# Patient Record
Sex: FEMALE | Race: WHITE | NOT HISPANIC OR LATINO | Employment: OTHER | URBAN - METROPOLITAN AREA
[De-identification: names, ages, dates, MRNs, and addresses within clinical notes are randomized per-mention and may not be internally consistent; named-entity substitution may affect disease eponyms.]

---

## 2020-11-07 ENCOUNTER — HOSPITAL ENCOUNTER (OUTPATIENT)
Dept: URGENT CARE | Facility: CLINIC | Age: 60
Discharge: HOME | End: 2020-11-07

## 2020-12-08 ENCOUNTER — CONVERSION ENCOUNTER (OUTPATIENT)
Dept: ORTHOPEDIC SURGERY | Facility: CLINIC | Age: 60
End: 2020-12-08

## 2020-12-08 ENCOUNTER — OFFICE VISIT CONVERTED (OUTPATIENT)
Dept: ORTHOPEDIC SURGERY | Facility: CLINIC | Age: 60
End: 2020-12-08
Attending: ORTHOPAEDIC SURGERY

## 2020-12-21 ENCOUNTER — HOSPITAL ENCOUNTER (OUTPATIENT)
Dept: PREADMISSION TESTING | Facility: HOSPITAL | Age: 60
Discharge: HOME | End: 2020-12-21
Attending: ORTHOPAEDIC SURGERY

## 2020-12-21 LAB
ALBUMIN SERPL-MCNC: 3.9 G/DL (ref 3.5–5)
ALBUMIN/GLOB SERPL: 1.6 {RATIO} (ref 1.4–2.6)
ALP SERPL-CCNC: 118 U/L (ref 53–141)
ALT SERPL-CCNC: 9 U/L (ref 10–40)
ANION GAP SERPL CALC-SCNC: 13 MMOL/L (ref 8–19)
APTT BLD: 25 S (ref 22.2–34.2)
AST SERPL-CCNC: 15 U/L (ref 15–50)
BASOPHILS # BLD AUTO: 0.02 10*3/UL (ref 0–0.2)
BASOPHILS NFR BLD AUTO: 0.3 % (ref 0–3)
BILIRUB SERPL-MCNC: 0.2 MG/DL (ref 0.2–1.3)
BUN SERPL-MCNC: 18 MG/DL (ref 5–25)
BUN/CREAT SERPL: 19 {RATIO} (ref 6–20)
CALCIUM SERPL-MCNC: 9.4 MG/DL (ref 8.7–10.4)
CHLORIDE SERPL-SCNC: 106 MMOL/L (ref 99–111)
CONV ABS IMM GRAN: 0.01 10*3/UL (ref 0–0.2)
CONV CO2: 25 MMOL/L (ref 22–32)
CONV IMMATURE GRAN: 0.2 % (ref 0–1.8)
CONV TOTAL PROTEIN: 6.4 G/DL (ref 6.3–8.2)
CREAT UR-MCNC: 0.96 MG/DL (ref 0.5–0.9)
DEPRECATED RDW RBC AUTO: 47.5 FL (ref 36.4–46.3)
EOSINOPHIL # BLD AUTO: 0.13 10*3/UL (ref 0–0.7)
EOSINOPHIL # BLD AUTO: 2 % (ref 0–7)
ERYTHROCYTE [DISTWIDTH] IN BLOOD BY AUTOMATED COUNT: 13.5 % (ref 11.7–14.4)
GFR SERPLBLD BASED ON 1.73 SQ M-ARVRAT: >60 ML/MIN/{1.73_M2}
GLOBULIN UR ELPH-MCNC: 2.5 G/DL (ref 2–3.5)
GLUCOSE SERPL-MCNC: 99 MG/DL (ref 65–99)
HCT VFR BLD AUTO: 36.8 % (ref 37–47)
HGB BLD-MCNC: 12 G/DL (ref 12–16)
INR PPP: 0.96 (ref 2–3)
LYMPHOCYTES # BLD AUTO: 1.75 10*3/UL (ref 1–5)
LYMPHOCYTES NFR BLD AUTO: 26.6 % (ref 20–45)
MCH RBC QN AUTO: 30.9 PG (ref 27–31)
MCHC RBC AUTO-ENTMCNC: 32.6 G/DL (ref 33–37)
MCV RBC AUTO: 94.8 FL (ref 81–99)
MONOCYTES # BLD AUTO: 0.55 10*3/UL (ref 0.2–1.2)
MONOCYTES NFR BLD AUTO: 8.3 % (ref 3–10)
NEUTROPHILS # BLD AUTO: 4.13 10*3/UL (ref 2–8)
NEUTROPHILS NFR BLD AUTO: 62.6 % (ref 30–85)
NRBC CBCN: 0 % (ref 0–0.7)
OSMOLALITY SERPL CALC.SUM OF ELEC: 292 MOSM/KG (ref 273–304)
PLATELET # BLD AUTO: 326 10*3/UL (ref 130–400)
PMV BLD AUTO: 9.3 FL (ref 9.4–12.3)
POTASSIUM SERPL-SCNC: 4.2 MMOL/L (ref 3.5–5.3)
PROTHROMBIN TIME: 10.4 S (ref 9.4–12)
RBC # BLD AUTO: 3.88 10*6/UL (ref 4.2–5.4)
SODIUM SERPL-SCNC: 140 MMOL/L (ref 135–147)
WBC # BLD AUTO: 6.59 10*3/UL (ref 4.8–10.8)

## 2020-12-22 LAB
EST. AVERAGE GLUCOSE BLD GHB EST-MCNC: 117 MG/DL
HBA1C MFR BLD: 5.7 % (ref 3.5–5.7)

## 2021-01-06 ENCOUNTER — HOSPITAL ENCOUNTER (OUTPATIENT)
Dept: PREADMISSION TESTING | Facility: HOSPITAL | Age: 61
Discharge: HOME | End: 2021-01-06
Attending: ORTHOPAEDIC SURGERY

## 2021-01-07 LAB — SARS-COV-2 RNA SPEC QL NAA+PROBE: DETECTED

## 2021-02-08 ENCOUNTER — HOSPITAL ENCOUNTER (OUTPATIENT)
Dept: PREADMISSION TESTING | Facility: HOSPITAL | Age: 61
Discharge: HOME | End: 2021-02-08
Attending: ORTHOPAEDIC SURGERY

## 2021-02-08 LAB
ALBUMIN SERPL-MCNC: 4 G/DL (ref 3.5–5)
ALBUMIN/GLOB SERPL: 1.4 {RATIO} (ref 1.4–2.6)
ALP SERPL-CCNC: 118 U/L (ref 53–141)
ALT SERPL-CCNC: 9 U/L (ref 10–40)
ANION GAP SERPL CALC-SCNC: 12 MMOL/L (ref 8–19)
APTT BLD: 25.2 S (ref 22.2–34.2)
AST SERPL-CCNC: 14 U/L (ref 15–50)
BASOPHILS # BLD AUTO: 0.02 10*3/UL (ref 0–0.2)
BASOPHILS NFR BLD AUTO: 0.3 % (ref 0–3)
BILIRUB SERPL-MCNC: 0.47 MG/DL (ref 0.2–1.3)
BUN SERPL-MCNC: 10 MG/DL (ref 5–25)
BUN/CREAT SERPL: 10 {RATIO} (ref 6–20)
CALCIUM SERPL-MCNC: 9.5 MG/DL (ref 8.7–10.4)
CHLORIDE SERPL-SCNC: 104 MMOL/L (ref 99–111)
CONV ABS IMM GRAN: 0.03 10*3/UL (ref 0–0.2)
CONV CO2: 25 MMOL/L (ref 22–32)
CONV IMMATURE GRAN: 0.4 % (ref 0–1.8)
CONV TOTAL PROTEIN: 6.9 G/DL (ref 6.3–8.2)
CREAT UR-MCNC: 0.96 MG/DL (ref 0.5–0.9)
DEPRECATED RDW RBC AUTO: 47.6 FL (ref 36.4–46.3)
EOSINOPHIL # BLD AUTO: 0.12 10*3/UL (ref 0–0.7)
EOSINOPHIL # BLD AUTO: 1.6 % (ref 0–7)
ERYTHROCYTE [DISTWIDTH] IN BLOOD BY AUTOMATED COUNT: 13.7 % (ref 11.7–14.4)
EST. AVERAGE GLUCOSE BLD GHB EST-MCNC: 105 MG/DL
GFR SERPLBLD BASED ON 1.73 SQ M-ARVRAT: >60 ML/MIN/{1.73_M2}
GLOBULIN UR ELPH-MCNC: 2.9 G/DL (ref 2–3.5)
GLUCOSE SERPL-MCNC: 96 MG/DL (ref 65–99)
HBA1C MFR BLD: 5.3 % (ref 3.5–5.7)
HCT VFR BLD AUTO: 36 % (ref 37–47)
HGB BLD-MCNC: 11.9 G/DL (ref 12–16)
INR PPP: 0.94 (ref 2–3)
LYMPHOCYTES # BLD AUTO: 1.78 10*3/UL (ref 1–5)
LYMPHOCYTES NFR BLD AUTO: 24.4 % (ref 20–45)
MCH RBC QN AUTO: 31.2 PG (ref 27–31)
MCHC RBC AUTO-ENTMCNC: 33.1 G/DL (ref 33–37)
MCV RBC AUTO: 94.5 FL (ref 81–99)
MONOCYTES # BLD AUTO: 0.61 10*3/UL (ref 0.2–1.2)
MONOCYTES NFR BLD AUTO: 8.4 % (ref 3–10)
NEUTROPHILS # BLD AUTO: 4.73 10*3/UL (ref 2–8)
NEUTROPHILS NFR BLD AUTO: 64.9 % (ref 30–85)
NRBC CBCN: 0 % (ref 0–0.7)
OSMOLALITY SERPL CALC.SUM OF ELEC: 283 MOSM/KG (ref 273–304)
PLATELET # BLD AUTO: 361 10*3/UL (ref 130–400)
PMV BLD AUTO: 9.5 FL (ref 9.4–12.3)
POTASSIUM SERPL-SCNC: 4 MMOL/L (ref 3.5–5.3)
PROTHROMBIN TIME: 10.4 S (ref 9.4–12)
RBC # BLD AUTO: 3.81 10*6/UL (ref 4.2–5.4)
SODIUM SERPL-SCNC: 137 MMOL/L (ref 135–147)
WBC # BLD AUTO: 7.29 10*3/UL (ref 4.8–10.8)

## 2021-02-15 ENCOUNTER — HOSPITAL ENCOUNTER (OUTPATIENT)
Dept: PERIOP | Facility: HOSPITAL | Age: 61
Setting detail: HOSPITAL OUTPATIENT SURGERY
Discharge: HOME | End: 2021-02-16
Attending: INTERNAL MEDICINE

## 2021-02-15 LAB
ALBUMIN SERPL-MCNC: 3.4 G/DL (ref 3.5–5)
ALBUMIN/GLOB SERPL: 1.4 {RATIO} (ref 1.4–2.6)
ALP SERPL-CCNC: 106 U/L (ref 53–141)
ALT SERPL-CCNC: 10 U/L (ref 10–40)
ANION GAP SERPL CALC-SCNC: 15 MMOL/L (ref 8–19)
AST SERPL-CCNC: 18 U/L (ref 15–50)
BILIRUB SERPL-MCNC: 0.19 MG/DL (ref 0.2–1.3)
BUN SERPL-MCNC: 16 MG/DL (ref 5–25)
BUN/CREAT SERPL: 11 {RATIO} (ref 6–20)
CALCIUM SERPL-MCNC: 9 MG/DL (ref 8.7–10.4)
CHLORIDE SERPL-SCNC: 105 MMOL/L (ref 99–111)
CONV CO2: 23 MMOL/L (ref 22–32)
CONV TOTAL PROTEIN: 5.8 G/DL (ref 6.3–8.2)
CREAT UR-MCNC: 1.46 MG/DL (ref 0.5–0.9)
GFR SERPLBLD BASED ON 1.73 SQ M-ARVRAT: 38 ML/MIN/{1.73_M2}
GLOBULIN UR ELPH-MCNC: 2.4 G/DL (ref 2–3.5)
GLUCOSE SERPL-MCNC: 157 MG/DL (ref 65–99)
HCT VFR BLD AUTO: 32.6 % (ref 37–47)
HGB BLD-MCNC: 10.7 G/DL (ref 12–16)
MAGNESIUM SERPL-MCNC: 2.06 MG/DL (ref 1.6–2.3)
OSMOLALITY SERPL CALC.SUM OF ELEC: 292 MOSM/KG (ref 273–304)
POTASSIUM SERPL-SCNC: 4 MMOL/L (ref 3.5–5.3)
SODIUM SERPL-SCNC: 139 MMOL/L (ref 135–147)
TROPONIN T SERPL-MCNC: <0.01 NG/ML (ref 0–0.1)

## 2021-02-16 LAB
ANION GAP SERPL CALC-SCNC: 12 MMOL/L (ref 8–19)
BASOPHILS # BLD AUTO: 0.03 10*3/UL (ref 0–0.2)
BASOPHILS NFR BLD AUTO: 0.2 % (ref 0–3)
BUN SERPL-MCNC: 15 MG/DL (ref 5–25)
BUN/CREAT SERPL: 14 {RATIO} (ref 6–20)
CALCIUM SERPL-MCNC: 8 MG/DL (ref 8.7–10.4)
CHLORIDE SERPL-SCNC: 109 MMOL/L (ref 99–111)
CONV ABS IMM GRAN: 0.04 10*3/UL (ref 0–0.2)
CONV CO2: 23 MMOL/L (ref 22–32)
CONV IMMATURE GRAN: 0.3 % (ref 0–1.8)
CREAT UR-MCNC: 1.09 MG/DL (ref 0.5–0.9)
DEPRECATED RDW RBC AUTO: 46.5 FL (ref 36.4–46.3)
EOSINOPHIL # BLD AUTO: 0.04 10*3/UL (ref 0–0.7)
EOSINOPHIL # BLD AUTO: 0.3 % (ref 0–7)
ERYTHROCYTE [DISTWIDTH] IN BLOOD BY AUTOMATED COUNT: 13.2 % (ref 11.7–14.4)
GFR SERPLBLD BASED ON 1.73 SQ M-ARVRAT: 55 ML/MIN/{1.73_M2}
GLUCOSE SERPL-MCNC: 136 MG/DL (ref 65–99)
HCT VFR BLD AUTO: 28.7 % (ref 37–47)
HGB BLD-MCNC: 9.3 G/DL (ref 12–16)
LYMPHOCYTES # BLD AUTO: 1.78 10*3/UL (ref 1–5)
LYMPHOCYTES NFR BLD AUTO: 14.5 % (ref 20–45)
MAGNESIUM SERPL-MCNC: 2.09 MG/DL (ref 1.6–2.3)
MCH RBC QN AUTO: 31.2 PG (ref 27–31)
MCHC RBC AUTO-ENTMCNC: 32.4 G/DL (ref 33–37)
MCV RBC AUTO: 96.3 FL (ref 81–99)
MONOCYTES # BLD AUTO: 1.39 10*3/UL (ref 0.2–1.2)
MONOCYTES NFR BLD AUTO: 11.3 % (ref 3–10)
NEUTROPHILS # BLD AUTO: 9.01 10*3/UL (ref 2–8)
NEUTROPHILS NFR BLD AUTO: 73.4 % (ref 30–85)
NRBC CBCN: 0 % (ref 0–0.7)
OSMOLALITY SERPL CALC.SUM OF ELEC: 293 MOSM/KG (ref 273–304)
PLATELET # BLD AUTO: 252 10*3/UL (ref 130–400)
PMV BLD AUTO: 9.5 FL (ref 9.4–12.3)
POTASSIUM SERPL-SCNC: 3.9 MMOL/L (ref 3.5–5.3)
RBC # BLD AUTO: 2.98 10*6/UL (ref 4.2–5.4)
SODIUM SERPL-SCNC: 140 MMOL/L (ref 135–147)
WBC # BLD AUTO: 12.29 10*3/UL (ref 4.8–10.8)

## 2021-03-02 ENCOUNTER — OFFICE VISIT CONVERTED (OUTPATIENT)
Dept: ORTHOPEDIC SURGERY | Facility: CLINIC | Age: 61
End: 2021-03-02
Attending: PHYSICIAN ASSISTANT

## 2021-03-26 ENCOUNTER — OFFICE VISIT CONVERTED (OUTPATIENT)
Dept: CARDIOLOGY | Facility: CLINIC | Age: 61
End: 2021-03-26
Attending: INTERNAL MEDICINE

## 2021-03-26 ENCOUNTER — CONVERSION ENCOUNTER (OUTPATIENT)
Dept: CARDIOLOGY | Facility: CLINIC | Age: 61
End: 2021-03-26
Attending: INTERNAL MEDICINE

## 2021-04-06 ENCOUNTER — OFFICE VISIT CONVERTED (OUTPATIENT)
Dept: ORTHOPEDIC SURGERY | Facility: CLINIC | Age: 61
End: 2021-04-06

## 2021-05-10 NOTE — H&P
History and Physical      Patient Name: Alice Wilkinson   Patient ID: [id number]   Sex: Female   YOB: 1960        Visit Date: December 8, 2020    Provider: Fausto Quintero MD   Location: [facility]   Location Address: [address] [place] [address]  [id number]   Location Phone: [phone]          Chief Complaint  · Left hip pain      History Of Present Illness  Alice Wilkinson is a 60 year old /White female who presents today to [facility].      Patient presents today for an evaluation of left hip pain. Patient presents today with MRI results of her left hip. She states her main source of pain in on the side of her hip. Patient hasn't had any treatment for her left hip. She has a history of a right total hip arthroplasty. She states she is ready  to consider a left hip replacement.       Past Medical History  Arthritis; Depression; Gastric reflux; Hip osteoarthritis; Pain, Arm; Pain, Back; Pain, Leg; Reflux; Right total hip replacement-Aftercare;following joint replacement         Past Surgical History  Colonoscopy; Hysterectomy         Medication List  atenolol 50 mg oral tablet; cetirizine 10 mg oral tablet; escitalopram oxalate 10 mg oral tablet; Keflex 500 mg oral capsule; lamotrigine 100 mg oral tablet; Naprosyn 500 mg oral tablet; Neurontin 300 mg oral capsule; Norco 7.5-325 mg oral tablet; omeprazole 20 mg oral capsule,delayed release(DR/EC); risperidone 1 mg oral tablet         Allergy List  NO KNOWN DRUG ALLERGIES       Allergies Reconciled  Family Medical History  Diabetes, unspecified type         Social History  Alcohol Use (Current some day); .; lives alone; Recreational Drug Use (Never); Retired.; Tobacco (Current every day)         Review of Systems  · Constitutional  o Denies  o : fever, chills, weight loss  · Cardiovascular  o Denies  o : chest pain, shortness of breath  · Gastrointestinal  o Denies  o : liver disease, heartburn, nausea, blood  "in stools  · Genitourinary  o Denies  o : painful urination, blood in urine  · Integument  o Denies  o : rash, itching  · Neurologic  o Denies  o : headache, weakness, loss of consciousness  · Musculoskeletal  o Denies  o : painful, swollen joints  · Psychiatric  o Denies  o : drug/alcohol addiction, anxiety, depression      Vitals  Date Time BP Position Site L\R Cuff Size HR RR TEMP (F) WT  HT  BMI kg/m2 BSA m2 O2 Sat FR L/min FiO2 HC       12/08/2020 11:07 AM      73 - R   153lbs 0oz 5'  7\" 23.96 1.81 99 %            Physical Examination  · Constitutional  o Appearance  o : well developed, well-nourished, no obvious deformities present  · Head and Face  o Head  o :   § Inspection  § : normocephalic  o Face  o :   § Inspection  § : no facial lesions  · Eyes  o Conjunctivae  o : conjunctivae normal  o Sclerae  o : sclerae white  · Ears, Nose, Mouth and Throat  o Ears  o :   § External Ears  § : appearance within normal limits  § Hearing  § : intact  o Nose  o :   § External Nose  § : appearance normal  · Neck  o Inspection/Palpation  o : normal appearance  o Range of Motion  o : full range of motion  · Respiratory  o Respiratory Effort  o : breathing unlabored  o Inspection of Chest  o : normal appearance  o Auscultation of Lungs  o : no audible wheezing or rales  · Cardiovascular  o Heart  o : regular rate  · Gastrointestinal  o Abdominal Examination  o : soft and non-tender  · Skin and Subcutaneous Tissue  o General Inspection  o : intact, no rashes  · Psychiatric  o General  o : Alert and oriented x3  o Judgement and Insight  o : judgment and insight intact  o Mood and Affect  o : mood normal, affect appropriate  · Left Hip  o Inspection  o : Sensation grossly intact. Neurovascular intact. Skin intact. Pulses are pleasant. Leg raise with mild pain. Limited adduction of hip. Limited internal and external rotation with groin pain. Full weightbearing. Antalgic gait. No swelling, skin discoloration or atrophy. Good " strength in quadriceps, hamstrings, dorsiflexors, and plantar flexors. Tender.   · In Office Procedures  o View  o : AP/LATERAL  o Site  o : left, hip   o Indication  o : Left hip pain   o Study  o : X-rays ordered, taken in the office, and reviewed today.  o Xray  o : Advanced degenerative changes of left hip, bone on bone osteoarthritis.   · Imaging  o Imaging  o : 11/16/20 MRI: 1. Advanced left hip arthrosis, detailed above, with extensive marrow edema throughout the proximal left femur and suspected developing subchondral insufficiency fracture along the superior weight-bearing left femoral head. 2. Small left hip effusion. 3. Right hip arthroplasty. 4. Advanced degenerative changes in the visualized lumbar spine. 5. No acute tendon pathology or significant bursal inflammation.           Assessment  · Primary osteoarthritis of left hip     715.15/M16.10  · Left Pain: Hip     719.45/M25.559      Plan  · Orders  o Hip (Left) 2 or more views (includes AP Pelvis) [facility] Preferred View (01014) - 719.45/M25.559 - 12/08/2020  · Medications  o Medications have been Reconciled  o Transition of Care or Provider Policy  · Instructions  o Dr. Quintero saw and examined the patient and agrees with plan.   o X-rays reviewed by Dr. Quintero.  o Reviewed the patient's Past Medical, Social, and Family history as well as the ROS at today's visit, no changes.  o Call or return if worsening symptoms.  o Discussed surgery.  o Risks/benefits discussed with patient including, but not limited to: infection, bleeding, neurovascular damage, malunion, nonunion, aesthetic deformity, need for further surgery, and death.  o Discussed with patient the implant type being used during surgery and patient understands and desires to proceed.  o Surgery pamphlet given.  o This note was transcribed by Irma Vargas.   o Discussed diagnosis and treatment options with the patient. Patient wishes to proceed with a left total hip replacement.  o Electronically  Identified Patient Education Materials Provided Electronically            Electronically Signed by: Irma Vargas-, Other -Author on December 8, 2020 01:21:59 PM  Electronically Co-signed by: Fausto Quintero MD -Reviewer on December 8, 2020 01:26:05 PM

## 2021-05-10 NOTE — PROCEDURES
Procedure Note      Patient Name: Alice Wilkinson   Patient ID: [id number]   Sex: Female   YOB: 1960        Visit Date: March 26, 2021    Provider: Joaquim Caballero MD   Location: [facility]   Location Address: [address] [place] [address], [facility]   [place]  [id number]   Location Phone: [phone]          FINAL REPORT   CARDIAC MONITOR REPORT     ZIO  PATCH MONITOR STUDY      DURATION:  03/01/2021 - 03/12/2021    INDICATION:  Paroxysmal SVT    FINDINGS:   Ms. Wilkinson was monitored for 11 days 4 hours. The average heart rate was 66 beats per minute with underlying sinus rhythm. The minimum heart rate of 52 beats per minute occurred at 8:53 AM on 03/06/2021 and was sinus bradycardia. The maximum heart rate of  110 beats per minute occurred at 8:40 AM on 03/12/2021 and was sinus tachycardia. No episodes of SVT or other arrhythmias were observed during the study. Rare isolated PACs and PVCs were noted, each constituting significantly less than 1% of the total heart beats. There was no evidence of significant pauses or atrioventricular block. Ms. Wilkinson did not report any symptoms during the study period.    CONCLUSIONS:  1.  Sinus rhythm with an average heart rate of 66 beats per minute and rare sinus bradycardia and sinus        tachycardia, likely physiologic in nature.   2.  No evidence of recurrent SVT or other arrhythmias.   3.  Rare isolated PACs and PVCs.   4.  No pauses or atrioventricular block.  5.  No symptoms reported  during the study.      Joaquim Caballero MD  BAP/pap                 Electronically Signed by: Mi Hernadez-, Other -Author on March 27, 2021 08:29:42 AM  Electronically Co-signed by: Joaquim Caballero MD -Reviewer on March 30, 2021 04:48:02 PM

## 2021-05-14 VITALS — HEIGHT: 65 IN | OXYGEN SATURATION: 97 % | HEART RATE: 63 BPM | WEIGHT: 156.25 LBS | BODY MASS INDEX: 26.03 KG/M2

## 2021-05-14 VITALS — OXYGEN SATURATION: 98 % | WEIGHT: 160 LBS | HEIGHT: 67 IN | BODY MASS INDEX: 25.11 KG/M2 | HEART RATE: 71 BPM

## 2021-05-14 VITALS
DIASTOLIC BLOOD PRESSURE: 60 MMHG | HEIGHT: 65 IN | BODY MASS INDEX: 25.83 KG/M2 | HEART RATE: 66 BPM | SYSTOLIC BLOOD PRESSURE: 130 MMHG | WEIGHT: 155 LBS

## 2021-05-14 VITALS — BODY MASS INDEX: 24.01 KG/M2 | WEIGHT: 153 LBS | OXYGEN SATURATION: 99 % | HEART RATE: 73 BPM | HEIGHT: 67 IN

## 2021-05-14 NOTE — PROGRESS NOTES
Progress Note      Patient Name: Alice Wilkinson   Patient ID: [id number]   Sex: Female   YOB: 1960        Visit Date: April 6, 2021    Provider: Davonte Patton PA-C   Location: [facility]   Location Address: [address] [place] [address]  [id number]   Location Phone: [phone]          Chief Complaint  · left hip pain      History Of Present Illness  Alice Wilkinson is a 61 year old /White female who presents today to [facility]. Patient is here for evaluation of her left total hip replacement performed on 2/15/21. She is about 6 weeks out. She is using no assistant device. She does not want to go to therapy. She says she is just doing it on her own.       Past Medical History  Arthritis; Depression; Gastric reflux; Hip osteoarthritis; Limb Swelling; Pain, Arm; Pain, Back; Pain, Leg; Reflux; Right total hip replacement-Aftercare;following joint replacement         Past Surgical History  Artificial Joints/Limbs; Colonoscopy; Hysterectomy; Joint Surgery         Medication List  atenolol 50 mg oral tablet; cetirizine 10 mg oral tablet; escitalopram oxalate 10 mg oral tablet; lamotrigine 100 mg oral tablet; MELOXICAM 15MG TABLETS; Mobic 15 mg oral tablet; Neurontin 300 mg oral capsule; Norco 7.5-325 mg oral tablet; omeprazole 20 mg oral capsule,delayed release(DR/EC); risperidone 1 mg oral tablet         Allergy List  NO KNOWN DRUG ALLERGIES         Family Medical History  Stroke; Heart Disease; Diabetes, unspecified type         Social History  Alcohol Use (Current some day); .; lives alone; Recreational Drug Use (Never); Retired.; Tobacco (Current every day)         Review of Systems  · Constitutional  o Denies  o : fever, chills, weight loss  · Cardiovascular  o Denies  o : chest pain, shortness of breath  · Gastrointestinal  o Denies  o : liver disease, heartburn, nausea, blood in stools  · Genitourinary  o Denies  o : painful urination, blood in  "urine  · Integument  o Denies  o : rash, itching  · Neurologic  o Denies  o : headache, weakness, loss of consciousness  · Musculoskeletal  o Denies  o : painful, swollen joints  · Psychiatric  o Denies  o : drug/alcohol addiction, anxiety, depression      Vitals  Date Time BP Position Site L\R Cuff Size HR RR TEMP (F) WT  HT  BMI kg/m2 BSA m2 O2 Sat FR L/min FiO2        04/06/2021 02:57 PM      63 - R   156lbs 4oz 5'  5\" 26 1.8 97 %            Physical Examination  · Constitutional  o Appearance  o : well developed, well-nourished, no obvious deformities present  · Head and Face  o Head  o :   § Inspection  § : normocephalic  o Face  o :   § Inspection  § : no facial lesions  · Eyes  o Conjunctivae  o : conjunctivae normal  o Sclerae  o : sclerae white  · Ears, Nose, Mouth and Throat  o Ears  o :   § External Ears  § : appearance within normal limits  § Hearing  § : intact  o Nose  o :   § External Nose  § : appearance normal  · Neck  o Inspection/Palpation  o : normal appearance  o Range of Motion  o : full range of motion  · Respiratory  o Respiratory Effort  o : breathing unlabored  o Inspection of Chest  o : normal appearance  o Auscultation of Lungs  o : no audible wheezing or rales  · Cardiovascular  o Heart  o : regular rate  · Gastrointestinal  o Abdominal Examination  o : soft and non-tender  · Skin and Subcutaneous Tissue  o General Inspection  o : intact, no rashes  · Psychiatric  o General  o : Alert and oriented x3  o Judgement and Insight  o : judgment and insight intact  o Mood and Affect  o : mood normal, affect appropriate  · Left Hip  o Inspection  o : Wounds well-healed. No signs of infection. Calf supple, nontender. Sensation intact. Pulse is normal.           Assessment  · Left total hip replacement. -Aftercare; following joint replacement     V54.81/Z47.1  · Left Pain: Hip     719.45/M25.559      Plan  · Medications  o Medications have been Reconciled  o Transition of Care or Provider " Policy  · Instructions  o Reviewed the patient's Past Medical, Social, and Family history as well as the ROS at today's visit, no changes.  o Call or return if worsening symptoms.  o This note is transcribed by Muna Lozano /[email or web address]  o She will follow-up in 4-6 months.             Electronically Signed by: Muna Lozano, -Author on April 8, 2021 08:18:55 AM  Electronically Co-signed by: Fasuto Quintero MD -Reviewer on April 8, 2021 08:20:07 AM

## 2021-05-14 NOTE — PROGRESS NOTES
Progress Note      Patient Name: Alice Wilkinson   Patient ID: [id number]   Sex: Female   YOB: 1960        Visit Date: March 26, 2021    Provider: Joaquim Caballero MD   Location: [facility]   Location Address: [address] [place] [address], [facility]   [place]  [id number]   Location Phone: [phone]          Chief Complaint     Hospital follow-up.       History Of Present Illness  REFERRING CARE PROVIDER: REFERRING CARE PROVIDER NAME   Alice Wilkinson is a 61 year old /White female who presents for routine hospital follow-up and the results of her 2 week Zio patch event monitor today. She was seen at [facility] in consultation on 02/26/2021 after presenting to the emergency department with palpitations and generalized fatigue/weakness. She was found to be in SVT with a heart rate in the 180s at that time. Review of her EKG was suggestive of AV NRT. She was given 6 mg of IV adenosine and converted back to sinus rhythm following a short pause. Ms. Wilkinson had also developed another episode of SVT earlier on 02/15 following a surgical procedure earlier that day. At that time, her heart rate was in the 140s, and her EKG from the 15th was highly suggestive of an atrial tachycardia with 2:1 block. At that time, she converted back to sinus rhythm after being given 5 mg of IV metoprolol. Since her hospital discharge on 02/26 she has not developed any recurrent palpitations/tachycardia, fatigue, shortness of breath, chest pain/pressure, orthopnea, PND, lightheadedness, syncope, or other symptoms. She was started on metoprolol 50 mg twice daily last month when she was seen in consultation in the emergency department. She states that she is tolerating this medication well and does not report any significant side effects. Unfortunately, she continues to smoke 1 pack of cigarettes daily and does not express significant interest in quitting at present. Her recent Zio patch 2 week monitor showed no  "evidence of recurrent SVT or other arrhythmias. It did reveal rare isolated PACs and PVCs and an average heart rate of 66 beats per minute.   PAST MEDICAL HISTORY: Chronic back pain; Depression; Gastroesophageal reflux disease; Osteoarthritis; SVT converted back to NSR.   PSYCHOSOCIAL HISTORY: Rarely uses alcohol. Currently smokes one pack per day.   CURRENT MEDICATIONS: Medication list was reviewed and is as documented.      ALLERGIES: No known drug allergies.       Review of Systems  · Cardiovascular  o Admits  o : shortness of breath while walking or lying flat  o Denies  o : palpitations (fast, fluttering, or skipping beats), swelling (feet, ankles, hands), chest pain or angina pectoris   · Respiratory  o Admits  o : Nonproductive cough.      Vitals  Date Time BP Position Site L\R Cuff Size HR RR TEMP (F) WT  HT  BMI kg/m2 BSA m2 O2 Sat FR L/min FiO2 HC       03/26/2021 03:32 /60 Sitting    66 - R   155lbs 0oz 5'  5\" 25.79 1.8             Physical Examination  · Respiratory  o Auscultation of Lungs  o : Clear to auscultation bilaterally. No rales or wheezing. Mildly prolonged expiratory phase, normal effort. No increased work of breathing. No tachypnea.   · Cardiovascular  o Heart  o : Regular rate and rhythm. Normal S1 and S2. No murmur or S3 gallop. PMI is not displaced.   · Gastrointestinal  o Abdominal Examination  o : Soft, nontender, nondistended. No masses. No hepatomegaly. Normal bowel sounds throughout all quadrants.  · Extremities  o Extremities  o : No cyanosis or edema. 2+ radial pulses bilaterally.      CONSTITUTIONAL: Well-developed, well-nourished, alert and oriented. No acute distress.  NECK: Supple, no JVD. No carotid bruits. No masses.  NEUROLOGIC: Normal speech. Cranial nerves II-XII grossly intact. No focal deficits.   SKIN: Warm and dry. No rashes or lesions. No jaundice.             Assessment     1.  Paroxysmal SVT. The patient's recent Zio patch monitor showed no evidence of " recurrence. Her EKG at the time of her episode on 02/26 was strongly suggestive of AV NRT and a previous EKG from 02/15 was suggestive of atrial tachycardia with 2:1 block.   2.  Chronic ongoing tobacco abuse. She does not express interest in quitting smoking today.   3.  Status post left total hip replacement on 02/15.  4.  Chronic lower back pain.   5.  Suspected COPD per previous imaging.          Plan     We will continue therapy with metoprolol 50 mg twice daily for now. Her recent event monitor showed no evidence of recurrent SVT and she does not report any recurrent symptoms. Her echo last month showed normal left ventricular systolic function with an estimated ejection fraction of 55% and normal left ventricular wall motion as well as grade 1 diastolic dysfunction and mild mitral regurgitation. Tobacco cessation counseling was again provided. If she is continuing to feel well and tolerate her medications, I will just plan to see her back in the office in 6 months.         Joaquim Caballero MD  BP/vh                Electronically Signed by: Marian Rodrigues-, OT -Author on April 8, 2021 07:09:07 AM  Electronically Co-signed by: Joaquim Caballero MD -Reviewer on April 8, 2021 12:49:47 PM

## 2021-05-14 NOTE — PROGRESS NOTES
Progress Note      Patient Name: Alice Wilkinson   Patient ID: [id number]   Sex: Female   YOB: 1960    Referring Provider: Fausto Quintero MD    Visit Date: March 2, 2021    Provider: Bethanie Gannon PA-C   Location: [facility]   Location Address: [address] [place] [address]  [id number]   Location Phone: [phone]          Chief Complaint  · Follow up left hip replacement      History Of Present Illness  Alice Wilkinson is a 60 year old /White female who presents today to [facility].      She is s/p left anterior REBECCA 2/15/21 by Dr. Quintero. She is doing well. She has home PT. She states pain is improving.       Past Medical History  Arthritis; Depression; Gastric reflux; Hip osteoarthritis; Limb Swelling; Pain, Arm; Pain, Back; Pain, Leg; Reflux; Right total hip replacement-Aftercare;following joint replacement         Past Surgical History  Artificial Joints/Limbs; Colonoscopy; Hysterectomy; Joint Surgery         Medication List  atenolol 50 mg oral tablet; cetirizine 10 mg oral tablet; escitalopram oxalate 10 mg oral tablet; lamotrigine 100 mg oral tablet; Neurontin 300 mg oral capsule; Norco 7.5-325 mg oral tablet; omeprazole 20 mg oral capsule,delayed release(/EC); risperidone 1 mg oral tablet         Allergy List  NO KNOWN DRUG ALLERGIES       Allergies Reconciled  Family Medical History  Stroke; Heart Disease; Diabetes, unspecified type         Social History  Alcohol Use (Current some day); .; lives alone; Recreational Drug Use (Never); Retired.; Tobacco (Current every day)         Review of Systems  · Constitutional  o Denies  o : fever, chills, weight loss  · Cardiovascular  o Denies  o : chest pain, shortness of breath  · Gastrointestinal  o Denies  o : liver disease, heartburn, nausea, blood in stools  · Genitourinary  o Denies  o : painful urination, blood in urine  · Integument  o Denies  o : rash, itching  · Neurologic  o Denies  o : headache,  "weakness, loss of consciousness  · Musculoskeletal  o Admits  o : painful, swollen joints  · Psychiatric  o Denies  o : drug/alcohol addiction, anxiety, depression      Vitals  Date Time BP Position Site L\R Cuff Size HR RR TEMP (F) WT  HT  BMI kg/m2 BSA m2 O2 Sat FR L/min FiO2 HC       03/02/2021 08:48 AM      71 - R   160lbs 0oz 5'  7\" 25.06 1.85 98 %            Physical Examination  · Constitutional  o Appearance  o : well developed, well-nourished, no obvious deformities present  · Head and Face  o Head  o :   § Inspection  § : normocephalic  o Face  o :   § Inspection  § : no facial lesions  · Eyes  o Conjunctivae  o : conjunctivae normal  o Sclerae  o : sclerae white  · Ears, Nose, Mouth and Throat  o Ears  o :   § External Ears  § : appearance within normal limits  § Hearing  § : intact  o Nose  o :   § External Nose  § : appearance normal  · Neck  o Inspection/Palpation  o : normal appearance  o Range of Motion  o : full range of motion  · Respiratory  o Respiratory Effort  o : breathing unlabored  o Inspection of Chest  o : normal appearance  o Auscultation of Lungs  o : no audible wheezing or rales  · Cardiovascular  o Heart  o : regular rate  · Gastrointestinal  o Abdominal Examination  o : soft and non-tender  · Skin and Subcutaneous Tissue  o General Inspection  o : intact, no rashes  · Psychiatric  o General  o : Alert and oriented x3  o Judgement and Insight  o : judgment and insight intact  o Mood and Affect  o : mood normal, affect appropriate  · Left Hip  o Inspection  o : Well approximated incision. No signs of infection. All compartments soft and well perfused. PROM appropriate. negative Log roll. Calf supple/nontender. Neurovascularly intact. Ambulates with walker.   · In Office Procedures  o View  o : AP/LATERAL  o Site  o : left, hip   o Indication  o : Left hip pain   o Study  o : X-rays ordered, taken in the office, and reviewed today.  o Xray  o : Well aligned left REBECCA  o Comparative " Data  o : Comparative Data found and reviewed today           Assessment  · Aftercare following left hip joint replacement surgery       Aftercare following joint replacement surgery     V54.81/Z47.1  Presence of left artificial hip joint     V54.81/Z96.642  · Left Pain: Hip     719.45/M25.559      Plan  · Orders  o Hip (Left) 2 or more views (includes AP Pelvis) [facility] Preferred View (92414) - 719.45/M25.559 - 03/02/2021  · Medications  o Medications have been Reconciled  o Transition of Care or Provider Policy  · Instructions  o Reviewed the patient's Past Medical, Social, and Family history as well as the ROS at today's visit, no changes.  o Call or return if worsening symptoms.  o Continue PT. Follow up 4 weeks.   o Electronically Identified Patient Education Materials Provided Electronically            Electronically Signed by: LAWANDA Mullins-C -Author on March 2, 2021 09:25:13 AM  Electronically Co-signed by: Fausto Quintero MD -Reviewer on March 2, 2021 05:51:16 PM

## 2021-05-22 ENCOUNTER — TRANSCRIBE ORDERS (OUTPATIENT)
Dept: ADMINISTRATIVE | Facility: HOSPITAL | Age: 61
End: 2021-05-22

## 2021-05-22 DIAGNOSIS — Z87.81 S/P LEFT HIP FRACTURE: ICD-10-CM

## 2021-05-22 DIAGNOSIS — Q78.2 OSTEOPETROSIS: Primary | ICD-10-CM

## 2021-06-30 DIAGNOSIS — M25.552 LEFT HIP PAIN: Primary | ICD-10-CM

## 2021-06-30 RX ORDER — MELOXICAM 15 MG/1
TABLET ORAL
Qty: 90 TABLET | Refills: 1 | Status: SHIPPED | OUTPATIENT
Start: 2021-06-30 | End: 2021-12-28

## 2021-07-22 ENCOUNTER — OFFICE VISIT (OUTPATIENT)
Dept: ORTHOPEDIC SURGERY | Facility: CLINIC | Age: 61
End: 2021-07-22

## 2021-07-22 VITALS — WEIGHT: 160 LBS | BODY MASS INDEX: 25.11 KG/M2 | OXYGEN SATURATION: 96 % | HEIGHT: 67 IN | HEART RATE: 61 BPM

## 2021-07-22 DIAGNOSIS — Z47.1 AFTERCARE FOLLOWING LEFT HIP JOINT REPLACEMENT SURGERY: ICD-10-CM

## 2021-07-22 DIAGNOSIS — Z96.642 AFTERCARE FOLLOWING LEFT HIP JOINT REPLACEMENT SURGERY: ICD-10-CM

## 2021-07-22 DIAGNOSIS — M70.62 TROCHANTERIC BURSITIS OF LEFT HIP: ICD-10-CM

## 2021-07-22 DIAGNOSIS — M25.552 LEFT HIP PAIN: Primary | ICD-10-CM

## 2021-07-22 PROCEDURE — 96372 THER/PROPH/DIAG INJ SC/IM: CPT | Performed by: PHYSICIAN ASSISTANT

## 2021-07-22 PROCEDURE — 99213 OFFICE O/P EST LOW 20 MIN: CPT | Performed by: PHYSICIAN ASSISTANT

## 2021-07-22 RX ORDER — DEXAMETHASONE SODIUM PHOSPHATE 4 MG/ML
8 INJECTION, SOLUTION INTRA-ARTICULAR; INTRALESIONAL; INTRAMUSCULAR; INTRAVENOUS; SOFT TISSUE ONCE
Status: DISCONTINUED | OUTPATIENT
Start: 2021-07-22 | End: 2021-07-22

## 2021-07-22 RX ORDER — DEXAMETHASONE SODIUM PHOSPHATE 4 MG/ML
8 INJECTION, SOLUTION INTRA-ARTICULAR; INTRALESIONAL; INTRAMUSCULAR; INTRAVENOUS; SOFT TISSUE ONCE
Status: SHIPPED | OUTPATIENT
Start: 2021-07-22

## 2021-07-22 RX ORDER — DEXAMETHASONE SODIUM PHOSPHATE 4 MG/ML
8 INJECTION, SOLUTION INTRA-ARTICULAR; INTRALESIONAL; INTRAMUSCULAR; INTRAVENOUS; SOFT TISSUE ONCE
Status: COMPLETED | OUTPATIENT
Start: 2021-07-22 | End: 2021-07-22

## 2021-07-22 RX ADMIN — DEXAMETHASONE SODIUM PHOSPHATE 8 MG: 4 INJECTION, SOLUTION INTRA-ARTICULAR; INTRALESIONAL; INTRAMUSCULAR; INTRAVENOUS; SOFT TISSUE at 16:34

## 2021-07-22 NOTE — PATIENT INSTRUCTIONS
· Stretching and strengthening exercises performed for trochanteric bursitis - recommend doing 3-4 x weekly  · Falls precautions  · Continue with lifelong antibiotic prophylaxis with dental procedures following total joint replacement.  · Follow up in 7 month(s) at 1 year surgery anniversary.   · Call with any changes or concerns.

## 2021-07-22 NOTE — PROGRESS NOTES
"Chief Complaint  Follow-up and Pain of the Left Hip    Subjective          Alice Wilkinson presents to [facility] for s/p left total hip arthroplasty on 02-15-21 by Dr. Quintero. Patient states her hip has pain localized on the lateral aspect of her buttock. She denies groin pain. Patient did not attend physical therapy, she states she did exercises and strengthening on her own. She states her pain is worse when she walks or stands up for long periods. Otherwise she is doing well and has no other complaints at this time.     Objective   Vital Signs:   Pulse 61   Ht 170.2 cm (67\")   Wt 72.6 kg (160 lb)   SpO2 96%   BMI 25.06 kg/m²       Physical Exam  Constitutional:       Appearance: Normal appearance. He is well-developed and normal weight.   HENT:      Head: Normocephalic.      Right Ear: Hearing and external ear normal.      Left Ear: Hearing and external ear normal.      Nose: Nose normal.   Eyes:      Conjunctiva/sclera: Conjunctivae normal.   Cardiovascular:      Rate and Rhythm: Normal rate.   Pulmonary:      Effort: Pulmonary effort is normal.      Breath sounds: No wheezing or rales.   Abdominal:      Palpations: Abdomen is soft.      Tenderness: There is no abdominal tenderness.   Musculoskeletal:      Cervical back: Normal range of motion.   Skin:     Findings: No rash.   Neurological:      Mental Status: He is alert and oriented to person, place, and time.   Psychiatric:         Mood and Affect: Mood and affect normal.         Judgment: Judgment normal.     Ortho Exam  Left hip: Well-healed scar.  Tenderness of the greater trochanter.  Tenderness over the IT band.  No swelling, atrophy or discoloration.  Full weightbearing.  Gait is normal.  Active range of motion with forward flexion, abduction, extension.  Slow transition from sit to stand.  Good muscle tone of the hip extensors, flexors and abductors.  Sensation is intact.  Neurovascular intact.  Posterior tibialis pulse " 2+.  Dorsalis pedis pulse 2+.      Result Review :   The following data was reviewed by: LAWANDA Wilde on 07/22/2021:           X-Ray Report:  Study: X-rays ordered, taken in the office, and reviewed today  Site: left hip Xray  Indication: left hip pain  View: AP and Lateral view(s)  Findings: Intact left total hip arthroplasty. No signs of subsidence or hardware failure.   Prior studies available for comparison: yes      Assessment and Plan    Problem List Items Addressed This Visit        Musculoskeletal and Injuries    Left hip pain - Primary    Relevant Medications    dexamethasone (DECADRON) injection 8 mg (Completed)    dexamethasone (DECADRON) injection 8 mg    Other Relevant Orders    XR Hip With or Without Pelvis 2 - 3 View Left (Completed)    Aftercare following left hip joint replacement surgery    Relevant Medications    dexamethasone (DECADRON) injection 8 mg (Completed)    dexamethasone (DECADRON) injection 8 mg    Trochanteric bursitis of left hip    Relevant Medications    dexamethasone (DECADRON) injection 8 mg (Completed)    dexamethasone (DECADRON) injection 8 mg          Follow Up   Return in about 7 months (around 2/22/2022).  Patient Instructions     · Stretching and strengthening exercises performed for trochanteric bursitis - recommend doing 3-4 x weekly  · Falls precautions  · Continue with lifelong antibiotic prophylaxis with dental procedures following total joint replacement.  · Follow up in 7 month(s) at 1 year surgery anniversary.   · Call with any changes or concerns.       Patient was given instructions and counseling regarding her condition or for health maintenance advice. Please see specific information pulled into the AVS if appropriate.

## 2021-08-13 ENCOUNTER — OFFICE VISIT (OUTPATIENT)
Dept: ORTHOPEDIC SURGERY | Facility: CLINIC | Age: 61
End: 2021-08-13

## 2021-08-13 VITALS — OXYGEN SATURATION: 98 % | WEIGHT: 160 LBS | HEART RATE: 69 BPM | HEIGHT: 67 IN | BODY MASS INDEX: 25.11 KG/M2

## 2021-08-13 DIAGNOSIS — Z96.642 AFTERCARE FOLLOWING LEFT HIP JOINT REPLACEMENT SURGERY: ICD-10-CM

## 2021-08-13 DIAGNOSIS — M25.552 LEFT HIP PAIN: Primary | ICD-10-CM

## 2021-08-13 DIAGNOSIS — Z47.1 AFTERCARE FOLLOWING LEFT HIP JOINT REPLACEMENT SURGERY: ICD-10-CM

## 2021-08-13 PROCEDURE — 99212 OFFICE O/P EST SF 10 MIN: CPT | Performed by: PHYSICIAN ASSISTANT

## 2021-08-13 RX ORDER — ATENOLOL 50 MG/1
TABLET ORAL
COMMUNITY

## 2021-08-13 RX ORDER — LAMOTRIGINE 100 MG/1
100 TABLET ORAL 3 TIMES DAILY
COMMUNITY
Start: 2021-07-01

## 2021-08-13 RX ORDER — METOPROLOL TARTRATE 50 MG/1
50 TABLET, FILM COATED ORAL 2 TIMES DAILY
COMMUNITY
Start: 2021-07-01 | End: 2021-12-29 | Stop reason: SDUPTHER

## 2021-08-13 RX ORDER — FAMOTIDINE 40 MG/1
TABLET, FILM COATED ORAL
COMMUNITY
Start: 2021-08-12

## 2021-08-13 RX ORDER — OMEPRAZOLE 20 MG/1
CAPSULE, DELAYED RELEASE ORAL
COMMUNITY

## 2021-08-13 RX ORDER — MONTELUKAST SODIUM 10 MG/1
10 TABLET ORAL DAILY
COMMUNITY
Start: 2021-07-03

## 2021-08-13 RX ORDER — CETIRIZINE HYDROCHLORIDE 10 MG/1
TABLET ORAL
COMMUNITY

## 2021-08-13 RX ORDER — ACYCLOVIR 400 MG/1
400 TABLET ORAL 2 TIMES DAILY
COMMUNITY
Start: 2021-07-02

## 2021-08-13 RX ORDER — ESCITALOPRAM OXALATE 10 MG/1
TABLET ORAL
COMMUNITY
Start: 2021-08-11

## 2021-08-13 RX ORDER — MELOXICAM 15 MG/1
TABLET ORAL
COMMUNITY
Start: 2021-04-06

## 2021-08-13 RX ORDER — RISPERIDONE 2 MG/1
TABLET, ORALLY DISINTEGRATING ORAL
COMMUNITY
Start: 2021-07-06

## 2021-08-13 NOTE — PROGRESS NOTES
"Chief Complaint  Pain of the Left Hip    Subjective          Alice Wilkinson presents to [facility] for evaluation of left hip. Patient is s/p left total hip arthroplasty on 02/15/21 by Dr. Quintero. She presents today due to recent fall around two weeks ago. She states that she missed a step resulting in her falling on her left side. She localizes pain over the lateral aspect of her hip as well as over the buttock. She denies groin pain. She denies instability or changes in gait or function of hip.     Objective   Vital Signs:   Pulse 69   Ht 170.2 cm (67\")   Wt 72.6 kg (160 lb)   SpO2 98%   BMI 25.06 kg/m²       Physical Exam  Constitutional:       Appearance: Normal appearance. He is well-developed and normal weight.   HENT:      Head: Normocephalic.      Right Ear: Hearing and external ear normal.      Left Ear: Hearing and external ear normal.      Nose: Nose normal.   Eyes:      Conjunctiva/sclera: Conjunctivae normal.   Cardiovascular:      Rate and Rhythm: Normal rate.   Pulmonary:      Effort: Pulmonary effort is normal.      Breath sounds: No wheezing or rales.   Abdominal:      Palpations: Abdomen is soft.      Tenderness: There is no abdominal tenderness.   Musculoskeletal:      Cervical back: Normal range of motion.   Skin:     Findings: No rash.   Neurological:      Mental Status: He is alert and oriented to person, place, and time.   Psychiatric:         Mood and Affect: Mood and affect normal.         Judgment: Judgment normal.     Ortho Exam  Left hip: Well-healed scar. No discoloration or abrasions. Active range of motion is equal to contralateral side. Good muscle tone of the hip extensors, flexors and abductors. Gait is normal. Sensation is intact. Neurovascular intact. Posterior tibialis pulse 2+. Dorsalis pedis pulse 2+. Full AROM of the knee and at the ankle.    Result Review :   The following data was reviewed by: LAWANDA Wilde on 08/13/2021:     "     Imaging Results (Most Recent)     Procedure Component Value Units Date/Time    XR Hip With or Without Pelvis 2 - 3 View Left [[id number]] Resulted: 08/13/21 1138     Updated: 08/13/21 1140    Narrative:      X-Ray Report:  Study: X-rays ordered, taken in the office, and reviewed today  Site: left hip Xray  Indication: left hip pain   View: AP and Lateral view(s)  Findings: intact left total hip arthroplasty. No signs of periprosthetic   fracture.   Prior studies available for comparison: yes                   Assessment and Plan    Problem List Items Addressed This Visit        Musculoskeletal and Injuries    Left hip pain - Primary    Relevant Orders    XR Hip With or Without Pelvis 2 - 3 View Left (Completed)    Aftercare following left hip joint replacement surgery          Follow Up   Return for Recheck.  Patient Instructions   Continue with 1 year follow up. Call with any changes or concerns.   Work on gentle ROM exercises demonstrated in clinic today.     Patient was given instructions and counseling regarding her condition or for health maintenance advice. Please see specific information pulled into the AVS if appropriate.

## 2021-08-13 NOTE — PATIENT INSTRUCTIONS
Continue with 1 year follow up. Call with any changes or concerns.   Work on gentle ROM exercises demonstrated in clinic today.

## 2021-12-28 DIAGNOSIS — M25.552 LEFT HIP PAIN: ICD-10-CM

## 2021-12-28 RX ORDER — MELOXICAM 15 MG/1
TABLET ORAL
Qty: 90 TABLET | Refills: 1 | Status: SHIPPED | OUTPATIENT
Start: 2021-12-28 | End: 2022-06-13

## 2021-12-31 RX ORDER — METOPROLOL TARTRATE 50 MG/1
50 TABLET, FILM COATED ORAL 2 TIMES DAILY
Qty: 30 TABLET | Refills: 0 | Status: SHIPPED | OUTPATIENT
Start: 2021-12-31 | End: 2022-01-10

## 2022-02-10 ENCOUNTER — HOSPITAL ENCOUNTER (OUTPATIENT)
Dept: BONE DENSITY | Facility: HOSPITAL | Age: 62
Discharge: HOME | End: 2022-02-10
Admitting: NURSE PRACTITIONER

## 2022-02-10 DIAGNOSIS — Q78.2 OSTEOPETROSIS: ICD-10-CM

## 2022-02-10 PROCEDURE — 77080 DXA BONE DENSITY AXIAL: CPT

## 2022-02-22 ENCOUNTER — OFFICE VISIT (OUTPATIENT)
Dept: ORTHOPEDIC SURGERY | Facility: CLINIC | Age: 62
End: 2022-02-22

## 2022-02-22 VITALS — HEART RATE: 84 BPM | BODY MASS INDEX: 27.5 KG/M2 | WEIGHT: 175.2 LBS | HEIGHT: 67 IN | OXYGEN SATURATION: 98 %

## 2022-02-22 DIAGNOSIS — Z47.1 AFTERCARE FOLLOWING LEFT HIP JOINT REPLACEMENT SURGERY: ICD-10-CM

## 2022-02-22 DIAGNOSIS — Z96.642 AFTERCARE FOLLOWING LEFT HIP JOINT REPLACEMENT SURGERY: ICD-10-CM

## 2022-02-22 DIAGNOSIS — Z96.642 AFTERCARE FOLLOWING LEFT HIP JOINT REPLACEMENT SURGERY: Primary | ICD-10-CM

## 2022-02-22 DIAGNOSIS — Z47.1 AFTERCARE FOLLOWING LEFT HIP JOINT REPLACEMENT SURGERY: Primary | ICD-10-CM

## 2022-02-22 PROCEDURE — 99213 OFFICE O/P EST LOW 20 MIN: CPT | Performed by: PHYSICIAN ASSISTANT

## 2022-02-22 NOTE — PROGRESS NOTES
"Chief Complaint  Follow-up of the Left Hip    Subjective          Alice Wilkinson presents to [facility] for s/p left total hip arthroplasty performed on 02/15/2021 by Dr. Quintero. Patient states her left hip is doing well. She denies groin pain. She is pleased with her outcome following total hip arthroplasty. She has no other new complaints today.    Objective   No Known Allergies    Vital Signs:   Pulse 84   Ht 170.2 cm (67\")   Wt 79.5 kg (175 lb 3.2 oz)   SpO2 98%   BMI 27.44 kg/m²       Physical Exam  Constitutional:       Appearance: Normal appearance. Patient is well-developed and normal weight.   HENT:      Head: Normocephalic.      Right Ear: Hearing and external ear normal.      Left Ear: Hearing and external ear normal.      Nose: Nose normal.   Eyes:      Conjunctiva/sclera: Conjunctivae normal.   Cardiovascular:      Rate and Rhythm: Normal rate.   Pulmonary:      Effort: Pulmonary effort is normal.      Breath sounds: No wheezing or rales.   Abdominal:      Palpations: Abdomen is soft.      Tenderness: There is no abdominal tenderness.   Musculoskeletal:      Cervical back: Normal range of motion.   Skin:     Findings: No rash.   Neurological:      Mental Status: Patient is alert and oriented to person, place, and time.   Psychiatric:         Mood and Affect: Mood and affect normal.         Judgment: Judgment normal.     Ortho Exam  Left hip: Scars well-healed. Good muscle tone of the hip extensors, flexors, abductors, abductors. Fully weightbearing, gait is nonantalgic. Sensation intact. Neurovascular intact. Posterior tibialis pulse 2+.    Result Review :   The following data was reviewed by: LAWANDA Wilde on 02/22/2022:         Imaging Results (Most Recent)     Procedure Component Value Units Date/Time    XR Hip With or Without Pelvis 2 - 3 View Left [[id number]] Resulted: 02/22/22 1307     Updated: 02/22/22 1308    Narrative:      X-Ray Report:  Study: X-rays " ordered, taken in the office, and reviewed today  Site: left hip Xray  Indication: left hip REBECCA  View: AP and Lateral view(s)  Findings: left total hip arthroplasty is intact without signs of wearing   or loosening   Prior studies available for comparison: yes                   Assessment and Plan    Problem List Items Addressed This Visit        Musculoskeletal and Injuries    Aftercare following left hip joint replacement surgery - Primary    Relevant Orders    XR Hip With or Without Pelvis 2 - 3 View Left (Completed)          Follow Up   Return in about 1 year (around 2/22/2023).  Patient Instructions   Call with any changes or concerns.      Ensure antibiotic prophylaxis is given prior to dental procedures.      Follow up annually.      Patient was given instructions and counseling regarding her condition or for health maintenance advice. Please see specific information pulled into the AVS if appropriate.

## 2022-02-22 NOTE — PATIENT INSTRUCTIONS
Call with any changes or concerns.      Ensure antibiotic prophylaxis is given prior to dental procedures.      Follow up annually.

## 2022-06-12 DIAGNOSIS — M25.552 LEFT HIP PAIN: ICD-10-CM

## 2022-06-13 RX ORDER — MELOXICAM 15 MG/1
TABLET ORAL
Qty: 90 TABLET | Refills: 1 | Status: SHIPPED | OUTPATIENT
Start: 2022-06-13

## 2024-02-14 ENCOUNTER — APPOINTMENT (OUTPATIENT)
Dept: GENERAL RADIOLOGY | Facility: HOSPITAL | Age: 64
End: 2024-02-14
Payer: MEDICARE

## 2024-02-14 ENCOUNTER — HOSPITAL ENCOUNTER (EMERGENCY)
Facility: HOSPITAL | Age: 64
Discharge: HOME | End: 2024-02-14
Attending: EMERGENCY MEDICINE | Admitting: EMERGENCY MEDICINE
Payer: MEDICARE

## 2024-02-14 VITALS
RESPIRATION RATE: 17 BRPM | HEIGHT: 67 IN | HEART RATE: 82 BPM | TEMPERATURE: 97.8 F | WEIGHT: 162.26 LBS | DIASTOLIC BLOOD PRESSURE: 75 MMHG | SYSTOLIC BLOOD PRESSURE: 158 MMHG | BODY MASS INDEX: 25.47 KG/M2 | OXYGEN SATURATION: 97 %

## 2024-02-14 DIAGNOSIS — K59.00 CONSTIPATION, UNSPECIFIED CONSTIPATION TYPE: ICD-10-CM

## 2024-02-14 DIAGNOSIS — R07.9 CHEST PAIN, UNSPECIFIED TYPE: Primary | ICD-10-CM

## 2024-02-14 LAB
ALBUMIN SERPL-MCNC: 3.9 G/DL (ref 3.5–5.2)
ALBUMIN/GLOB SERPL: 1.6 G/DL
ALP SERPL-CCNC: 87 U/L (ref 39–117)
ALT SERPL W P-5'-P-CCNC: 7 U/L (ref 1–33)
ANION GAP SERPL CALCULATED.3IONS-SCNC: 10.3 MMOL/L (ref 5–15)
AST SERPL-CCNC: 10 U/L (ref 1–32)
BASOPHILS # BLD AUTO: 0.04 10*3/MM3 (ref 0–0.2)
BASOPHILS NFR BLD AUTO: 0.4 % (ref 0–1.5)
BILIRUB SERPL-MCNC: 0.3 MG/DL (ref 0–1.2)
BUN SERPL-MCNC: 16 MG/DL (ref 8–23)
BUN/CREAT SERPL: 16.7 (ref 7–25)
CALCIUM SPEC-SCNC: 9.1 MG/DL (ref 8.6–10.5)
CHLORIDE SERPL-SCNC: 108 MMOL/L (ref 98–107)
CO2 SERPL-SCNC: 22.7 MMOL/L (ref 22–29)
CREAT SERPL-MCNC: 0.96 MG/DL (ref 0.57–1)
DEPRECATED RDW RBC AUTO: 43.6 FL (ref 37–54)
EGFRCR SERPLBLD CKD-EPI 2021: 66.6 ML/MIN/1.73
EOSINOPHIL # BLD AUTO: 0.16 10*3/MM3 (ref 0–0.4)
EOSINOPHIL NFR BLD AUTO: 1.8 % (ref 0.3–6.2)
ERYTHROCYTE [DISTWIDTH] IN BLOOD BY AUTOMATED COUNT: 12.8 % (ref 12.3–15.4)
GEN 5 2HR TROPONIN T REFLEX: 8 NG/L
GLOBULIN UR ELPH-MCNC: 2.4 GM/DL
GLUCOSE SERPL-MCNC: 147 MG/DL (ref 65–99)
HCT VFR BLD AUTO: 34.6 % (ref 34–46.6)
HGB BLD-MCNC: 11.6 G/DL (ref 12–15.9)
HOLD SPECIMEN: NORMAL
HOLD SPECIMEN: NORMAL
IMM GRANULOCYTES # BLD AUTO: 0.03 10*3/MM3 (ref 0–0.05)
IMM GRANULOCYTES NFR BLD AUTO: 0.3 % (ref 0–0.5)
LIPASE SERPL-CCNC: 36 U/L (ref 13–60)
LYMPHOCYTES # BLD AUTO: 1.86 10*3/MM3 (ref 0.7–3.1)
LYMPHOCYTES NFR BLD AUTO: 20.4 % (ref 19.6–45.3)
MAGNESIUM SERPL-MCNC: 2.1 MG/DL (ref 1.6–2.4)
MCH RBC QN AUTO: 30.9 PG (ref 26.6–33)
MCHC RBC AUTO-ENTMCNC: 33.5 G/DL (ref 31.5–35.7)
MCV RBC AUTO: 92.3 FL (ref 79–97)
MONOCYTES # BLD AUTO: 0.63 10*3/MM3 (ref 0.1–0.9)
MONOCYTES NFR BLD AUTO: 6.9 % (ref 5–12)
NEUTROPHILS NFR BLD AUTO: 6.4 10*3/MM3 (ref 1.7–7)
NEUTROPHILS NFR BLD AUTO: 70.2 % (ref 42.7–76)
NRBC BLD AUTO-RTO: 0 /100 WBC (ref 0–0.2)
NT-PROBNP SERPL-MCNC: 71.4 PG/ML (ref 0–900)
PLATELET # BLD AUTO: 358 10*3/MM3 (ref 140–450)
PMV BLD AUTO: 9.5 FL (ref 6–12)
POTASSIUM SERPL-SCNC: 3.6 MMOL/L (ref 3.5–5.2)
PROT SERPL-MCNC: 6.3 G/DL (ref 6–8.5)
QT INTERVAL: 476 MS
QT INTERVAL: 515 MS
QTC INTERVAL: 437 MS
QTC INTERVAL: 459 MS
RBC # BLD AUTO: 3.75 10*6/MM3 (ref 3.77–5.28)
SODIUM SERPL-SCNC: 141 MMOL/L (ref 136–145)
TROPONIN T DELTA: -2 NG/L
TROPONIN T SERPL HS-MCNC: 10 NG/L
WBC NRBC COR # BLD AUTO: 9.12 10*3/MM3 (ref 3.4–10.8)
WHOLE BLOOD HOLD COAG: NORMAL
WHOLE BLOOD HOLD SPECIMEN: NORMAL

## 2024-02-14 PROCEDURE — 84484 ASSAY OF TROPONIN QUANT: CPT | Performed by: EMERGENCY MEDICINE

## 2024-02-14 PROCEDURE — 80053 COMPREHEN METABOLIC PANEL: CPT | Performed by: EMERGENCY MEDICINE

## 2024-02-14 PROCEDURE — 83735 ASSAY OF MAGNESIUM: CPT | Performed by: EMERGENCY MEDICINE

## 2024-02-14 PROCEDURE — 85025 COMPLETE CBC W/AUTO DIFF WBC: CPT | Performed by: EMERGENCY MEDICINE

## 2024-02-14 PROCEDURE — 96375 TX/PRO/DX INJ NEW DRUG ADDON: CPT

## 2024-02-14 PROCEDURE — 25010000002 HYDROMORPHONE 1 MG/ML SOLUTION: Performed by: EMERGENCY MEDICINE

## 2024-02-14 PROCEDURE — 93005 ELECTROCARDIOGRAM TRACING: CPT | Performed by: EMERGENCY MEDICINE

## 2024-02-14 PROCEDURE — 74019 RADEX ABDOMEN 2 VIEWS: CPT

## 2024-02-14 PROCEDURE — 99284 EMERGENCY DEPT VISIT MOD MDM: CPT

## 2024-02-14 PROCEDURE — 36415 COLL VENOUS BLD VENIPUNCTURE: CPT

## 2024-02-14 PROCEDURE — 93005 ELECTROCARDIOGRAM TRACING: CPT

## 2024-02-14 PROCEDURE — 83880 ASSAY OF NATRIURETIC PEPTIDE: CPT | Performed by: EMERGENCY MEDICINE

## 2024-02-14 PROCEDURE — 25010000002 ONDANSETRON PER 1 MG: Performed by: EMERGENCY MEDICINE

## 2024-02-14 PROCEDURE — 71045 X-RAY EXAM CHEST 1 VIEW: CPT

## 2024-02-14 PROCEDURE — 96374 THER/PROPH/DIAG INJ IV PUSH: CPT

## 2024-02-14 PROCEDURE — 83690 ASSAY OF LIPASE: CPT | Performed by: EMERGENCY MEDICINE

## 2024-02-14 RX ORDER — SODIUM CHLORIDE 0.9 % (FLUSH) 0.9 %
10 SYRINGE (ML) INJECTION AS NEEDED
Status: DISCONTINUED | OUTPATIENT
Start: 2024-02-14 | End: 2024-02-14 | Stop reason: HOSPADM

## 2024-02-14 RX ORDER — ASPIRIN 81 MG/1
324 TABLET, CHEWABLE ORAL ONCE
Status: DISCONTINUED | OUTPATIENT
Start: 2024-02-14 | End: 2024-02-14 | Stop reason: HOSPADM

## 2024-02-14 RX ORDER — ONDANSETRON 2 MG/ML
4 INJECTION INTRAMUSCULAR; INTRAVENOUS ONCE
Status: COMPLETED | OUTPATIENT
Start: 2024-02-14 | End: 2024-02-14

## 2024-02-14 RX ADMIN — HYDROMORPHONE HYDROCHLORIDE 0.5 MG: 1 INJECTION, SOLUTION INTRAMUSCULAR; INTRAVENOUS; SUBCUTANEOUS at 18:05

## 2024-02-14 RX ADMIN — ONDANSETRON 4 MG: 2 INJECTION INTRAMUSCULAR; INTRAVENOUS at 18:05

## 2024-02-14 NOTE — ED PROVIDER NOTES
Time: 5:31 PM EST  Date of encounter:  2/14/2024  Independent Historian/Clinical History and Information was obtained by:   Patient    History is limited by: N/A    Chief Complaint: Chest pain      History of Present Illness:  Patient is a 63 y.o. year old female who presents to the emergency department for evaluation of chest pain.  Patient reportedly had left-sided chest pain that started this morning.  Patient called EMS but then declined transportation to the emergency department.  EMS was called a second time and patient was brought to the ED.  Patient was given 3 sublingual nitros prior to arrival and aspirin.  Patient was also hypertensive per EMS.  On arrival patient states she is having some mild epigastric pain but no chest pain.  She reports she sometimes has issues with constipation last bowel movement was yesterday.    HPI    Patient Care Team  Primary Care Provider: Provider, Irene Known    Past Medical History:     No Known Allergies  Past Medical History:   Diagnosis Date    Arthritis     Depression     Gastric reflux     Hip osteoarthritis 08/25/2015    History of total hip replacement, right 10/19/2015    FOLLOWING JOINT REPLACEMENT     Limb swelling     Pain in arm     Pain in back     Pain in leg, unspecified      Past Surgical History:   Procedure Laterality Date    COLONOSCOPY      HYSTERECTOMY      OTHER SURGICAL HISTORY      ARTIFICAL JOINTS/ LIMBS     OTHER SURGICAL HISTORY      JOINT SURGERY     Family History   Problem Relation Age of Onset    Stroke Mother     Heart disease Mother     Diabetes Mother     Heart disease Father     Diabetes Father        Home Medications:  Prior to Admission medications    Medication Sig Start Date End Date Taking? Authorizing Provider   acyclovir (ZOVIRAX) 400 MG tablet Take 400 mg by mouth 2 (Two) Times a Day. 7/2/21   ProviderCandice MD   atenolol (TENORMIN) 50 MG tablet atenolol 50 mg oral tablet take 1 tablet (50 mg) by oral route once daily   Active  "   Candice Márquez MD   cetirizine (zyrTEC) 10 MG tablet cetirizine 10 mg oral tablet take 1 tablet (10 mg) by oral route once daily   Active    Candice Márquez MD   escitalopram (LEXAPRO) 10 MG tablet  8/11/21   Candice Márquez MD   famotidine (PEPCID) 40 MG tablet  8/12/21   Candice Márquez MD   lamoTRIgine (LaMICtal) 100 MG tablet Take 100 mg by mouth 3 (Three) Times a Day. 7/1/21   Candice Márquez MD   meloxicam (Mobic) 15 MG tablet Mobic 15 mg oral tablet take 1 tablet (15 mg) by oral route once daily 4/6/2021  Active 4/6/21   Candice Márquez MD   meloxicam (MOBIC) 15 MG tablet TAKE 1 TABLET(15 MG) BY MOUTH EVERY DAY 6/13/22   Fausto Quintero MD   montelukast (SINGULAIR) 10 MG tablet Take 10 mg by mouth Daily. 7/3/21   Candice Márquez MD   omeprazole (priLOSEC) 20 MG capsule omeprazole 20 mg oral capsule,delayed release(DR/EC) take 1 capsule (20 mg) by oral route once daily before a meal   Active    Candice Márquez MD   risperiDONE (risperDAL M-TABS) 2 MG disintegrating tablet PLACE 1 TABLET ON THE TONGUE AND ALLOW TO DISSOLVE TWICE DAILY 7/6/21   Candice Márquez MD        Social History:   Social History     Tobacco Use    Smoking status: Every Day     Years: 30     Types: Cigarettes    Smokeless tobacco: Never   Vaping Use    Vaping Use: Never used   Substance Use Topics    Alcohol use: Yes     Comment: CURRENT SOME DAY    Drug use: Never         Review of Systems:  Review of Systems   Cardiovascular:  Positive for chest pain.   Gastrointestinal:  Positive for abdominal pain and constipation.        Physical Exam:  /75   Pulse 82   Temp 97.8 °F (36.6 °C) (Oral)   Resp 17   Ht 170.2 cm (67\")   Wt 73.6 kg (162 lb 4.1 oz)   LMP  (LMP Unknown)   SpO2 97%   BMI 25.41 kg/m²     Physical Exam  Vitals and nursing note reviewed.   Constitutional:       General: She is not in acute distress.  Cardiovascular:      Rate and Rhythm: Normal rate and " regular rhythm.      Heart sounds: Normal heart sounds.   Pulmonary:      Effort: Pulmonary effort is normal. No respiratory distress.      Breath sounds: Normal breath sounds.   Abdominal:      General: Abdomen is flat.      Palpations: Abdomen is soft.      Tenderness: There is no abdominal tenderness.   Musculoskeletal:         General: Normal range of motion.      Cervical back: Normal range of motion.   Skin:     General: Skin is warm and dry.   Neurological:      Mental Status: She is alert and oriented to person, place, and time. Mental status is at baseline.                  Procedures:  Procedures      Medical Decision Making:  The patient is resting comfortably and feels better, is alert and in no distress. The repeat examination is unremarkable and benign. Electrocardiogram shows no signs of acute ischemia and the history, exam, diagnostic testing and current condition did not suggest that this patient is having an acute myocardial infarction, significant arrhythmia, unstable angina, esophageal perforation, pulmonary embolism, aortic dissection, severe pneumonia, sepsis for other significant pathology that would warrant further testing, continued ED treatment, admission, cardiology or other specialist consultation at this point. The vital signs have been stable. The patient's condition is stable and appropriate for discharge. The patient will pursue further outpatient evaluation with the primary care physician, or designated physician or cardiologist. The patient has expressed a clear and thorough understanding and agreed to follow-up as instructed.    Comorbidities that affect care:    Hypertension    External Notes reviewed:    Previous Clinic Note: Patient seen 2/22/2022 by physician assistant in orthopedic office for follow-up of left hip replacement.      The following orders were placed and all results were independently analyzed by me:  Orders Placed This Encounter   Procedures    XR Chest 1 View     XR Abdomen Flat & Upright    Mobile Draw    High Sensitivity Troponin T    Comprehensive Metabolic Panel    Lipase    BNP    Magnesium    CBC Auto Differential    High Sensitivity Troponin T 2Hr    NPO Diet NPO Type: Strict NPO    Undress & Gown    Continuous Pulse Oximetry    Oxygen Therapy- Nasal Cannula; Titrate 1-6 LPM Per SpO2; 90 - 95%    ECG 12 Lead ED Triage Standing Order; Chest Pain    ECG 12 Lead ED Triage Standing Order; Chest Pain    Insert Peripheral IV    CBC & Differential    Green Top (Gel)    Lavender Top    Gold Top - SST    Light Blue Top       Medications Given in the Emergency Department:  Medications   sodium chloride 0.9 % flush 10 mL (has no administration in time range)   aspirin chewable tablet 324 mg (324 mg Oral Not Given 2/14/24 1342)   HYDROmorphone (DILAUDID) injection 0.5 mg (0.5 mg Intravenous Given 2/14/24 1805)   ondansetron (ZOFRAN) injection 4 mg (4 mg Intravenous Given 2/14/24 1805)        ED Course:    ED Course as of 02/14/24 2006 Wed Feb 14, 2024 1943 EKG:    Rhythm: Sinus bradycardia  Rate: 56  Intervals: IVCD  T-wave: Low amplitude  V2  ST Segment: Normal    EKG Comparison: Not available    Interpreted by me   [NL]      ED Course User Index  [NL] Sergio Fraga DO       Labs:    Lab Results (last 24 hours)       Procedure Component Value Units Date/Time    High Sensitivity Troponin T [[id number]]  (Normal) Collected: 02/14/24 1344    Specimen: Blood Updated: 02/14/24 1413     HS Troponin T 10 ng/L     Narrative:      High Sensitive Troponin T Reference Range:  <14.0 ng/L- Negative Female for AMI  <22.0 ng/L- Negative Male for AMI  >=14 - Abnormal Female indicating possible myocardial injury.  >=22 - Abnormal Male indicating possible myocardial injury.   Clinicians would have to utilize clinical acumen, EKG, Troponin, and serial changes to determine if it is an Acute Myocardial Infarction or myocardial injury due to an underlying chronic condition.         CBC &  Differential [[id number]]  (Abnormal) Collected: 02/14/24 1344    Specimen: Blood Updated: 02/14/24 1352    Narrative:      The following orders were created for panel order CBC & Differential.  Procedure                               Abnormality         Status                     ---------                               -----------         ------                     CBC Auto Differential[[id number]]        Abnormal            Final result                 Please view results for these tests on the individual orders.    Comprehensive Metabolic Panel [[id number]]  (Abnormal) Collected: 02/14/24 1344    Specimen: Blood Updated: 02/14/24 1413     Glucose 147 mg/dL      BUN 16 mg/dL      Creatinine 0.96 mg/dL      Sodium 141 mmol/L      Potassium 3.6 mmol/L      Chloride 108 mmol/L      CO2 22.7 mmol/L      Calcium 9.1 mg/dL      Total Protein 6.3 g/dL      Albumin 3.9 g/dL      ALT (SGPT) 7 U/L      AST (SGOT) 10 U/L      Alkaline Phosphatase 87 U/L      Total Bilirubin 0.3 mg/dL      Globulin 2.4 gm/dL      A/G Ratio 1.6 g/dL      BUN/Creatinine Ratio 16.7     Anion Gap 10.3 mmol/L      eGFR 66.6 mL/min/1.73     Narrative:      GFR Normal >60  Chronic Kidney Disease <60  Kidney Failure <15      Lipase [[id number]]  (Normal) Collected: 02/14/24 1344    Specimen: Blood Updated: 02/14/24 1413     Lipase 36 U/L     BNP [[id number]]  (Normal) Collected: 02/14/24 1344    Specimen: Blood Updated: 02/14/24 1411     proBNP 71.4 pg/mL     Narrative:      This assay is used as an aid in the diagnosis of individuals suspected of having heart failure. It can be used as an aid in the diagnosis of acute decompensated heart failure (ADHF) in patients presenting with signs and symptoms of ADHF to the emergency department (ED). In addition, NT-proBNP of <300 pg/mL indicates ADHF is not likely.    Age Range Result Interpretation  NT-proBNP Concentration (pg/mL:      <50             Positive            >450                   [place]                  300-450                    Negative             <300    50-75           Positive            >900                  Gray                300-900                  Negative            <300      >75             Positive            >1800                  Gray                300-1800                  Negative            <300    Magnesium [[id number]]  (Normal) Collected: 02/14/24 1344    Specimen: Blood Updated: 02/14/24 1413     Magnesium 2.1 mg/dL     CBC Auto Differential [[id number]]  (Abnormal) Collected: 02/14/24 1344    Specimen: Blood Updated: 02/14/24 1352     WBC 9.12 10*3/mm3      RBC 3.75 10*6/mm3      Hemoglobin 11.6 g/dL      Hematocrit 34.6 %      MCV 92.3 fL      MCH 30.9 pg      MCHC 33.5 g/dL      RDW 12.8 %      RDW-SD 43.6 fl      MPV 9.5 fL      Platelets 358 10*3/mm3      Neutrophil % 70.2 %      Lymphocyte % 20.4 %      Monocyte % 6.9 %      Eosinophil % 1.8 %      Basophil % 0.4 %      Immature Grans % 0.3 %      Neutrophils, Absolute 6.40 10*3/mm3      Lymphocytes, Absolute 1.86 10*3/mm3      Monocytes, Absolute 0.63 10*3/mm3      Eosinophils, Absolute 0.16 10*3/mm3      Basophils, Absolute 0.04 10*3/mm3      Immature Grans, Absolute 0.03 10*3/mm3      nRBC 0.0 /100 WBC     High Sensitivity Troponin T 2Hr [[id number]]  (Normal) Collected: 02/14/24 1806    Specimen: Blood Updated: 02/14/24 1852     HS Troponin T 8 ng/L      Troponin T Delta -2 ng/L     Narrative:      High Sensitive Troponin T Reference Range:  <14.0 ng/L- Negative Female for AMI  <22.0 ng/L- Negative Male for AMI  >=14 - Abnormal Female indicating possible myocardial injury.  >=22 - Abnormal Male indicating possible myocardial injury.   Clinicians would have to utilize clinical acumen, EKG, Troponin, and serial changes to determine if it is an Acute Myocardial Infarction or myocardial injury due to an underlying chronic condition.                  Imaging:    XR Abdomen Flat & Upright    Result Date: 2/14/2024  PROCEDURE: XR ABDOMEN  FLAT AND UPRIGHT  COMPARISON: [facility], CR, XR CHEST 1 VW, 2/14/2024, 13:53.  INDICATIONS: Constipation  FINDINGS:   Prior bilateral total hip arthroplasty.  No evidence of bowel obstruction or pneumoperitoneum.  No abnormal calcifications are identified.  There is a moderate amount of stool in the colon and rectum.  Thoracolumbar scoliosis is present.  IMPRESSION: Moderate amount of stool in the colon and rectum.   YUN FRENCH MD       Electronically Signed and Approved By: YUN FRENCH MD on 2/14/2024 at 18:58             XR Chest 1 View    Result Date: 2/14/2024  PROCEDURE: XR CHEST 1 VW  COMPARISON: [facility], , CXR PORTABLE, 10/27/2018, 21:11.  INDICATIONS: Chest pain started today  FINDINGS:  Unchanged cardiomediastinal silhouette.  Somewhat low lung volumes with no focal airspace consolidation.  No pleural effusion or pneumothorax.  No acute osseous abnormality.       No acute cardiopulmonary abnormality.       TAYO SHORT MD       Electronically Signed and Approved By: TAYO SHORT MD on 2/14/2024 at 14:03                Differential Diagnosis and Discussion:    Chest Pain:  Based on the patient's signs and symptoms, I considered aortic dissection, myocardial infaction, pulmonary embolism, cardiac tamponade, pericarditis, pneumothorax, musculoskeletal chest pain and other differential diagnosis as an etiology of the patient's chest pain.     All labs were reviewed and interpreted by me.  All X-rays impressions were independently interpreted by me.  EKG was interpreted by me.    MDM         Patient Care Considerations:    CT ABDOMEN AND PELVIS: I considered ordering a CT scan of the abdomen and pelvis however abdominal x-rays were ordered instead      Consultants/Shared Management Plan:    None    Social Determinants of Health:    Patient is independent, reliable, and has access to care.       Disposition and Care Coordination:    Discharged: The patient is suitable  and stable for discharge with no need for consideration of admission.    I have explained the patient´s condition, diagnoses and treatment plan based on the information available to me at this time. I have answered questions and addressed any concerns. The patient has a good  understanding of the patient´s diagnosis, condition, and treatment plan as can be expected at this point. The vital signs have been stable. The patient´s condition is stable and appropriate for discharge from the emergency department.      The patient will pursue further outpatient evaluation with the primary care physician or other designated or consulting physician as outlined in the discharge instructions. They are agreeable to this plan of care and follow-up instructions have been explained in detail. The patient has received these instructions in written format and has expressed an understanding of the discharge instructions. The patient is aware that any significant change in condition or worsening of symptoms should prompt an immediate return to this or the closest emergency department or call to 911.    Final diagnoses:   Chest pain, unspecified type   Constipation, unspecified constipation type        ED Disposition       ED Disposition   Discharge    Condition   Stable    Comment   --               This medical record created using voice recognition software.             Sergio Fraga DO  02/14/24 2006

## 2024-02-15 NOTE — DISCHARGE INSTRUCTIONS
Recommend obtaining an over-the-counter bottle of magnesium citrate and drink entire bottle to facilitate a bowel movement.

## 2024-02-25 ENCOUNTER — HOSPITAL ENCOUNTER (EMERGENCY)
Facility: HOSPITAL | Age: 64
Discharge: HOME | End: 2024-02-25
Attending: EMERGENCY MEDICINE | Admitting: EMERGENCY MEDICINE
Payer: MEDICARE

## 2024-02-25 ENCOUNTER — APPOINTMENT (OUTPATIENT)
Dept: GENERAL RADIOLOGY | Facility: HOSPITAL | Age: 64
End: 2024-02-25
Payer: MEDICARE

## 2024-02-25 VITALS
HEART RATE: 77 BPM | BODY MASS INDEX: 24.91 KG/M2 | RESPIRATION RATE: 18 BRPM | HEIGHT: 67 IN | OXYGEN SATURATION: 95 % | DIASTOLIC BLOOD PRESSURE: 78 MMHG | SYSTOLIC BLOOD PRESSURE: 135 MMHG | TEMPERATURE: 97.7 F | WEIGHT: 158.73 LBS

## 2024-02-25 DIAGNOSIS — R07.89 ATYPICAL CHEST PAIN: Primary | ICD-10-CM

## 2024-02-25 LAB
ALBUMIN SERPL-MCNC: 3.7 G/DL (ref 3.5–5.2)
ALBUMIN/GLOB SERPL: 1.2 G/DL
ALP SERPL-CCNC: 118 U/L (ref 39–117)
ALT SERPL W P-5'-P-CCNC: 16 U/L (ref 1–33)
ANION GAP SERPL CALCULATED.3IONS-SCNC: 12.4 MMOL/L (ref 5–15)
AST SERPL-CCNC: 27 U/L (ref 1–32)
BASOPHILS # BLD AUTO: 0.05 10*3/MM3 (ref 0–0.2)
BASOPHILS NFR BLD AUTO: 0.6 % (ref 0–1.5)
BILIRUB SERPL-MCNC: 0.2 MG/DL (ref 0–1.2)
BUN SERPL-MCNC: 20 MG/DL (ref 8–23)
BUN/CREAT SERPL: 15.6 (ref 7–25)
CALCIUM SPEC-SCNC: 9.5 MG/DL (ref 8.6–10.5)
CHLORIDE SERPL-SCNC: 103 MMOL/L (ref 98–107)
CO2 SERPL-SCNC: 28.6 MMOL/L (ref 22–29)
CREAT SERPL-MCNC: 1.28 MG/DL (ref 0.57–1)
DEPRECATED RDW RBC AUTO: 44 FL (ref 37–54)
EGFRCR SERPLBLD CKD-EPI 2021: 47.2 ML/MIN/1.73
EOSINOPHIL # BLD AUTO: 0.23 10*3/MM3 (ref 0–0.4)
EOSINOPHIL NFR BLD AUTO: 3 % (ref 0.3–6.2)
ERYTHROCYTE [DISTWIDTH] IN BLOOD BY AUTOMATED COUNT: 12.9 % (ref 12.3–15.4)
GEN 5 2HR TROPONIN T REFLEX: 11 NG/L
GLOBULIN UR ELPH-MCNC: 3.1 GM/DL
GLUCOSE SERPL-MCNC: 138 MG/DL (ref 65–99)
HCT VFR BLD AUTO: 34.5 % (ref 34–46.6)
HGB BLD-MCNC: 11.2 G/DL (ref 12–15.9)
HOLD SPECIMEN: NORMAL
HOLD SPECIMEN: NORMAL
IMM GRANULOCYTES # BLD AUTO: 0.06 10*3/MM3 (ref 0–0.05)
IMM GRANULOCYTES NFR BLD AUTO: 0.8 % (ref 0–0.5)
LIPASE SERPL-CCNC: 33 U/L (ref 13–60)
LYMPHOCYTES # BLD AUTO: 1.96 10*3/MM3 (ref 0.7–3.1)
LYMPHOCYTES NFR BLD AUTO: 25.4 % (ref 19.6–45.3)
MAGNESIUM SERPL-MCNC: 2.2 MG/DL (ref 1.6–2.4)
MCH RBC QN AUTO: 30.1 PG (ref 26.6–33)
MCHC RBC AUTO-ENTMCNC: 32.5 G/DL (ref 31.5–35.7)
MCV RBC AUTO: 92.7 FL (ref 79–97)
MONOCYTES # BLD AUTO: 0.59 10*3/MM3 (ref 0.1–0.9)
MONOCYTES NFR BLD AUTO: 7.7 % (ref 5–12)
NEUTROPHILS NFR BLD AUTO: 4.82 10*3/MM3 (ref 1.7–7)
NEUTROPHILS NFR BLD AUTO: 62.5 % (ref 42.7–76)
NRBC BLD AUTO-RTO: 0 /100 WBC (ref 0–0.2)
NT-PROBNP SERPL-MCNC: <36 PG/ML (ref 0–900)
PLATELET # BLD AUTO: 532 10*3/MM3 (ref 140–450)
PMV BLD AUTO: 8.7 FL (ref 6–12)
POTASSIUM SERPL-SCNC: 2.8 MMOL/L (ref 3.5–5.2)
PROT SERPL-MCNC: 6.8 G/DL (ref 6–8.5)
RBC # BLD AUTO: 3.72 10*6/MM3 (ref 3.77–5.28)
SODIUM SERPL-SCNC: 144 MMOL/L (ref 136–145)
TROPONIN T DELTA: -1 NG/L
TROPONIN T SERPL HS-MCNC: 12 NG/L
WBC NRBC COR # BLD AUTO: 7.71 10*3/MM3 (ref 3.4–10.8)
WHOLE BLOOD HOLD COAG: NORMAL
WHOLE BLOOD HOLD SPECIMEN: NORMAL

## 2024-02-25 PROCEDURE — 80053 COMPREHEN METABOLIC PANEL: CPT

## 2024-02-25 PROCEDURE — 93005 ELECTROCARDIOGRAM TRACING: CPT | Performed by: EMERGENCY MEDICINE

## 2024-02-25 PROCEDURE — 99284 EMERGENCY DEPT VISIT MOD MDM: CPT

## 2024-02-25 PROCEDURE — 71045 X-RAY EXAM CHEST 1 VIEW: CPT

## 2024-02-25 PROCEDURE — 93010 ELECTROCARDIOGRAM REPORT: CPT | Performed by: INTERNAL MEDICINE

## 2024-02-25 PROCEDURE — 85025 COMPLETE CBC W/AUTO DIFF WBC: CPT

## 2024-02-25 PROCEDURE — 84484 ASSAY OF TROPONIN QUANT: CPT

## 2024-02-25 PROCEDURE — 36415 COLL VENOUS BLD VENIPUNCTURE: CPT

## 2024-02-25 PROCEDURE — 83735 ASSAY OF MAGNESIUM: CPT

## 2024-02-25 PROCEDURE — 83690 ASSAY OF LIPASE: CPT

## 2024-02-25 PROCEDURE — 93005 ELECTROCARDIOGRAM TRACING: CPT

## 2024-02-25 PROCEDURE — 84484 ASSAY OF TROPONIN QUANT: CPT | Performed by: EMERGENCY MEDICINE

## 2024-02-25 PROCEDURE — 83880 ASSAY OF NATRIURETIC PEPTIDE: CPT

## 2024-02-25 RX ORDER — ASPIRIN 81 MG/1
324 TABLET, CHEWABLE ORAL ONCE
Status: DISCONTINUED | OUTPATIENT
Start: 2024-02-25 | End: 2024-02-25 | Stop reason: HOSPADM

## 2024-02-25 RX ORDER — SODIUM CHLORIDE 0.9 % (FLUSH) 0.9 %
10 SYRINGE (ML) INJECTION AS NEEDED
Status: DISCONTINUED | OUTPATIENT
Start: 2024-02-25 | End: 2024-02-25 | Stop reason: HOSPADM

## 2024-02-25 NOTE — ED PROVIDER NOTES
Time: 10:58 AM EST  Date of encounter:  2/25/2024  Independent Historian/Clinical History and Information was obtained by:   Patient    History is limited by: N/A    Chief Complaint: Chest pain      History of Present Illness:  Patient is a 63 y.o. year old female who presents to the emergency department for evaluation of chest pain.  Patient states that she started having some chest pain that started around 8-830 this morning.  Reports that it was just in the middle of her chest almost at the bottom of her chest that at the start of her stomach.  States that it did not radiate anywhere and she did not have any shortness of breath, nausea, vomiting.  Does not have any history of cardiac disease.  Does have a history of reflux.  Currently her pain has resolved.  She has no history of cardiac disease and has never had a stress test or heart cath.  No other complaints this time.    [facility]    Patient Care Team  Primary Care Provider: Provider, No Known    Past Medical History:     No Known Allergies  Past Medical History:   Diagnosis Date    Arthritis     Depression     Gastric reflux     Hip osteoarthritis 08/25/2015    History of total hip replacement, right 10/19/2015    FOLLOWING JOINT REPLACEMENT     Limb swelling     Pain in arm     Pain in back     Pain in leg, unspecified      Past Surgical History:   Procedure Laterality Date    COLONOSCOPY      HYSTERECTOMY      OTHER SURGICAL HISTORY      ARTIFICAL JOINTS/ LIMBS     OTHER SURGICAL HISTORY      JOINT SURGERY     Family History   Problem Relation Age of Onset    Stroke Mother     Heart disease Mother     Diabetes Mother     Heart disease Father     Diabetes Father        Home Medications:  Prior to Admission medications    Medication Sig Start Date End Date Taking? Authorizing Provider   acyclovir (ZOVIRAX) 400 MG tablet Take 400 mg by mouth 2 (Two) Times a Day. 7/2/21   Provider, MD Candice   atenolol (TENORMIN) 50 MG tablet atenolol 50 mg oral tablet take 1  "tablet (50 mg) by oral route once daily   Active    Candice Márquez MD   cetirizine (zyrTEC) 10 MG tablet cetirizine 10 mg oral tablet take 1 tablet (10 mg) by oral route once daily   Active    Candice Márquez MD   escitalopram (LEXAPRO) 10 MG tablet  8/11/21   Candice Márquez MD   famotidine (PEPCID) 40 MG tablet  8/12/21   Candice Márquez MD   lamoTRIgine (LaMICtal) 100 MG tablet Take 100 mg by mouth 3 (Three) Times a Day. 7/1/21   Candice Márquez MD   meloxicam (Mobic) 15 MG tablet Mobic 15 mg oral tablet take 1 tablet (15 mg) by oral route once daily 4/6/2021  Active 4/6/21   Candice Márquez MD   meloxicam (MOBIC) 15 MG tablet TAKE 1 TABLET(15 MG) BY MOUTH EVERY DAY 6/13/22   Fausto Quintero MD   montelukast (SINGULAIR) 10 MG tablet Take 10 mg by mouth Daily. 7/3/21   Candice Márquez MD   omeprazole (priLOSEC) 20 MG capsule omeprazole 20 mg oral capsule,delayed release(DR/EC) take 1 capsule (20 mg) by oral route once daily before a meal   Active    Candice Márquez MD   risperiDONE (risperDAL M-TABS) 2 MG disintegrating tablet PLACE 1 TABLET ON THE TONGUE AND ALLOW TO DISSOLVE TWICE DAILY 7/6/21   Candice Márquez MD        Social History:   Social History     Tobacco Use    Smoking status: Every Day     Years: 30     Types: Cigarettes    Smokeless tobacco: Never   Vaping Use    Vaping Use: Never used   Substance Use Topics    Alcohol use: Yes     Comment: CURRENT SOME DAY    Drug use: Never         Review of Systems:  Review of Systems   Cardiovascular:  Positive for chest pain.        Physical Exam:  /78   Pulse 77   Temp 97.7 °F (36.5 °C) (Oral)   Resp 18   Ht 170.2 cm (67\")   Wt 72 kg (158 lb 11.7 oz)   LMP  (LMP Unknown)   SpO2 95%   BMI 24.86 kg/m²     Physical Exam  Vitals and nursing note reviewed.   Constitutional:       Appearance: Normal appearance.   HENT:      Head: Normocephalic and atraumatic.   Eyes:      General: No scleral " icterus.  Cardiovascular:      Rate and Rhythm: Normal rate and regular rhythm.      Heart sounds: Normal heart sounds.   Pulmonary:      Effort: Pulmonary effort is normal.      Breath sounds: Normal breath sounds.   Abdominal:      Palpations: Abdomen is soft.      Tenderness: There is no abdominal tenderness.   Musculoskeletal:         General: Normal range of motion.      Cervical back: Normal range of motion.   Skin:     Findings: No rash.   Neurological:      General: No focal deficit present.      Mental Status: She is alert.                  Procedures:  Procedures      Medical Decision Making:      Comorbidities that affect care:    GERD, depression    External Notes reviewed:  Reviewed ER visit from 2/14/2024        The following orders were placed and all results were independently analyzed by me:  Orders Placed This Encounter   Procedures    XR Chest 1 View    [place] Draw    High Sensitivity Troponin T    Comprehensive Metabolic Panel    Lipase    BNP    Magnesium    CBC Auto Differential    High Sensitivity Troponin T 2Hr    NPO Diet NPO Type: Strict NPO    Undress & Gown    Continuous Pulse Oximetry    Oxygen Therapy- Nasal Cannula; Titrate 1-6 LPM Per SpO2; 90 - 95%    ECG 12 Lead ED Triage Standing Order; Chest Pain    ECG 12 Lead ED Triage Standing Order; Chest Pain    Insert Peripheral IV    CBC & Differential    Green Top (Gel)    Lavender Top    Gold Top - SST    Light Blue Top       Medications Given in the Emergency Department:  Medications   sodium chloride 0.9 % flush 10 mL (has no administration in time range)   aspirin chewable tablet 324 mg (324 mg Oral Not Given 2/25/24 0917)        ED Course:    ED Course as of 02/25/24 1315   Sun Feb 25, 2024   1053 EKG interpreted by me  Time: 919  Heart rate 72  Sinus, nonspecific IVCD, nonspecific ST changes [MA]      ED Course User Index  [MA] Hesham Harrison MD       Labs:    Lab Results (last 24 hours)       Procedure Component Value Units  Date/Time    High Sensitivity Troponin T [[id number]]  (Normal) Collected: 02/25/24 0924    Specimen: Blood Updated: 02/25/24 0957     HS Troponin T 12 ng/L     Narrative:      High Sensitive Troponin T Reference Range:  <14.0 ng/L- Negative Female for AMI  <22.0 ng/L- Negative Male for AMI  >=14 - Abnormal Female indicating possible myocardial injury.  >=22 - Abnormal Male indicating possible myocardial injury.   Clinicians would have to utilize clinical acumen, EKG, Troponin, and serial changes to determine if it is an Acute Myocardial Infarction or myocardial injury due to an underlying chronic condition.         CBC & Differential [[id number]]  (Abnormal) Collected: 02/25/24 0924    Specimen: Blood Updated: 02/25/24 0931    Narrative:      The following orders were created for panel order CBC & Differential.  Procedure                               Abnormality         Status                     ---------                               -----------         ------                     CBC Auto Differential[[id number]]        Abnormal            Final result                 Please view results for these tests on the individual orders.    Comprehensive Metabolic Panel [[id number]]  (Abnormal) Collected: 02/25/24 0924    Specimen: Blood Updated: 02/25/24 0957     Glucose 138 mg/dL      BUN 20 mg/dL      Creatinine 1.28 mg/dL      Sodium 144 mmol/L      Potassium 2.8 mmol/L      Chloride 103 mmol/L      CO2 28.6 mmol/L      Calcium 9.5 mg/dL      Total Protein 6.8 g/dL      Albumin 3.7 g/dL      ALT (SGPT) 16 U/L      AST (SGOT) 27 U/L      Alkaline Phosphatase 118 U/L      Total Bilirubin 0.2 mg/dL      Globulin 3.1 gm/dL      A/G Ratio 1.2 g/dL      BUN/Creatinine Ratio 15.6     Anion Gap 12.4 mmol/L      eGFR 47.2 mL/min/1.73     Narrative:      GFR Normal >60  Chronic Kidney Disease <60  Kidney Failure <15      Lipase [[id number]]  (Normal) Collected: 02/25/24 0924    Specimen: Blood Updated: 02/25/24 0957     Lipase 33  U/L     BNP [[id number]]  (Normal) Collected: 02/25/24 0924    Specimen: Blood Updated: 02/25/24 0954     proBNP <36.0 pg/mL     Narrative:      This assay is used as an aid in the diagnosis of individuals suspected of having heart failure. It can be used as an aid in the diagnosis of acute decompensated heart failure (ADHF) in patients presenting with signs and symptoms of ADHF to the emergency department (ED). In addition, NT-proBNP of <300 pg/mL indicates ADHF is not likely.    Age Range Result Interpretation  NT-proBNP Concentration (pg/mL:      <50             Positive            >450                   Gray                 300-450                    Negative             <300    50-75           Positive            >900                  Gray                300-900                  Negative            <300      >75             Positive            >1800                  Gray                300-1800                  Negative            <300    Magnesium [[id number]]  (Normal) Collected: 02/25/24 0924    Specimen: Blood Updated: 02/25/24 0957     Magnesium 2.2 mg/dL     CBC Auto Differential [[id number]]  (Abnormal) Collected: 02/25/24 0924    Specimen: Blood Updated: 02/25/24 0931     WBC 7.71 10*3/mm3      RBC 3.72 10*6/mm3      Hemoglobin 11.2 g/dL      Hematocrit 34.5 %      MCV 92.7 fL      MCH 30.1 pg      MCHC 32.5 g/dL      RDW 12.9 %      RDW-SD 44.0 fl      MPV 8.7 fL      Platelets 532 10*3/mm3      Neutrophil % 62.5 %      Lymphocyte % 25.4 %      Monocyte % 7.7 %      Eosinophil % 3.0 %      Basophil % 0.6 %      Immature Grans % 0.8 %      Neutrophils, Absolute 4.82 10*3/mm3      Lymphocytes, Absolute 1.96 10*3/mm3      Monocytes, Absolute 0.59 10*3/mm3      Eosinophils, Absolute 0.23 10*3/mm3      Basophils, Absolute 0.05 10*3/mm3      Immature Grans, Absolute 0.06 10*3/mm3      nRBC 0.0 /100 WBC     High Sensitivity Troponin T 2Hr [[id number]]  (Normal) Collected: 02/25/24 1233    Specimen: Blood Updated:  02/25/24 1255     HS Troponin T 11 ng/L      Troponin T Delta -1 ng/L     Narrative:      High Sensitive Troponin T Reference Range:  <14.0 ng/L- Negative Female for AMI  <22.0 ng/L- Negative Male for AMI  >=14 - Abnormal Female indicating possible myocardial injury.  >=22 - Abnormal Male indicating possible myocardial injury.   Clinicians would have to utilize clinical acumen, EKG, Troponin, and serial changes to determine if it is an Acute Myocardial Infarction or myocardial injury due to an underlying chronic condition.                  Imaging:    XR Chest 1 View    Result Date: 2/25/2024  PROCEDURE: XR CHEST 1 VW  COMPARISON: [facility], CR, XR CHEST 1 VW, 2/14/2024, 13:53.  INDICATIONS: CHEST PAIN  FINDINGS:  LUNGS: Mild bibasilar opacities. VASCULATURE: Normal.  Unremarkable pulmonary vasculature.  CARDIAC: Normal.  No cardiac silhouette abnormality or cardiomegaly.  MEDIASTINUM: Normal.  No visible mass or adenopathy.  PLEURA: Normal.  No effusion or pleural thickening.  BONES: Degenerative changes along the shoulders. OTHER: Negative.        Mild bibasilar opacities, which could reflect atelectasis or pneumonia.       CEE SANDERS MD       Electronically Signed and Approved By: CEE SANDERS MD on 2/25/2024 at 9:45                Differential Diagnosis and Discussion:    Chest Pain:  Based on the patient's signs and symptoms, I considered aortic dissection, myocardial infaction, pulmonary embolism, cardiac tamponade, pericarditis, pneumothorax, musculoskeletal chest pain and other differential diagnosis as an etiology of the patient's chest pain.     All labs were reviewed and interpreted by me.  All X-rays impressions were independently interpreted by me.  EKG was interpreted by me.    MDM     Amount and/or Complexity of Data Reviewed  Clinical lab tests: reviewed  Tests in the radiology section of CPT®: reviewed  Tests in the medicine section of CPT®: reviewed       Patient  63-year-old female who presents with complaints of chest pain.  Currently resolved at this time.  2 negative sets and very atypical story.  Appears to be more reflux related.  She is currently chest pain-free and wishing to go home.  Will give cardiology follow-up for outpatient.          Patient Care Considerations:          Consultants/Shared Management Plan:    None    Social Determinants of Health:    Patient is independent, reliable, and has access to care.       Disposition and Care Coordination:    Discharged: The patient is suitable and stable for discharge with no need for consideration of admission.    I have explained the patient´s condition, diagnoses and treatment plan based on the information available to me at this time. I have answered questions and addressed any concerns. The patient has a good  understanding of the patient´s diagnosis, condition, and treatment plan as can be expected at this point. The vital signs have been stable. The patient´s condition is stable and appropriate for discharge from the emergency department.      The patient will pursue further outpatient evaluation with the primary care physician or other designated or consulting physician as outlined in the discharge instructions. They are agreeable to this plan of care and follow-up instructions have been explained in detail. The patient has received these instructions in written format and have expressed an understanding of the discharge instructions. The patient is aware that any significant change in condition or worsening of symptoms should prompt an immediate return to this or the closest emergency department or call to 911.      Final diagnoses:   Atypical chest pain        ED Disposition       ED Disposition   Discharge    Condition   Stable    Comment   --               This medical record created using voice recognition software.             Hesham Harrison MD  02/25/24 9016

## 2024-02-26 LAB
QT INTERVAL: 432 MS
QT INTERVAL: 464 MS
QTC INTERVAL: 494 MS
QTC INTERVAL: 507 MS

## 2024-03-06 LAB
QT INTERVAL: 515 MS
QTC INTERVAL: 437 MS

## 2024-03-08 LAB
QT INTERVAL: 476 MS
QTC INTERVAL: 459 MS

## 2024-06-05 ENCOUNTER — TRANSCRIBE ORDERS (OUTPATIENT)
Dept: ADMINISTRATIVE | Facility: HOSPITAL | Age: 64
End: 2024-06-05
Payer: MEDICARE

## 2024-06-05 DIAGNOSIS — R00.2 PALPITATIONS: ICD-10-CM

## 2024-06-05 DIAGNOSIS — R07.9 CHEST PAIN, UNSPECIFIED TYPE: Primary | ICD-10-CM

## 2024-06-05 DIAGNOSIS — R06.00 DYSPNEA, UNSPECIFIED TYPE: ICD-10-CM

## 2025-05-13 ENCOUNTER — TRANSCRIBE ORDERS (OUTPATIENT)
Dept: ADMINISTRATIVE | Facility: HOSPITAL | Age: 65
End: 2025-05-13
Payer: MEDICARE

## 2025-05-13 DIAGNOSIS — N18.31 STAGE 3A CHRONIC KIDNEY DISEASE: Primary | ICD-10-CM

## 2025-05-22 ENCOUNTER — HOSPITAL ENCOUNTER (OUTPATIENT)
Dept: ULTRASOUND IMAGING | Facility: HOSPITAL | Age: 65
Discharge: HOME | End: 2025-05-22
Admitting: NURSE PRACTITIONER
Payer: MEDICARE

## 2025-05-22 DIAGNOSIS — N18.31 STAGE 3A CHRONIC KIDNEY DISEASE: ICD-10-CM

## 2025-05-22 PROCEDURE — 76775 US EXAM ABDO BACK WALL LIM: CPT

## 2025-05-28 ENCOUNTER — TRANSCRIBE ORDERS (OUTPATIENT)
Dept: ADMINISTRATIVE | Facility: HOSPITAL | Age: 65
End: 2025-05-28
Payer: MEDICARE

## 2025-05-28 DIAGNOSIS — N95.1 POST MENOPAUSAL SYNDROME: Primary | ICD-10-CM

## 2025-05-29 ENCOUNTER — TRANSCRIBE ORDERS (OUTPATIENT)
Dept: ADMINISTRATIVE | Facility: HOSPITAL | Age: 65
End: 2025-05-29
Payer: MEDICARE

## 2025-05-29 DIAGNOSIS — Z72.89 OTHER PROBLEMS RELATED TO LIFESTYLE: Primary | ICD-10-CM
